# Patient Record
Sex: FEMALE | Race: WHITE | Employment: UNEMPLOYED | URBAN - METROPOLITAN AREA
[De-identification: names, ages, dates, MRNs, and addresses within clinical notes are randomized per-mention and may not be internally consistent; named-entity substitution may affect disease eponyms.]

---

## 2017-02-02 LAB
ABO + RH BLD: NORMAL
ABO + RH BLD: NORMAL
BLD GP AB SCN SERPL QL: NORMAL
HBV SURFACE AG SERPL QL IA: NORMAL
HIV 1+2 AB+HIV1 P24 AG SERPL QL IA: NORMAL
T PALLIDUM IGG SER QL: NON REACTIVE

## 2017-07-12 LAB
ABO + RH BLD: NORMAL
ABO + RH BLD: NORMAL

## 2017-07-27 ENCOUNTER — HOSPITAL ENCOUNTER (OUTPATIENT)
Facility: CLINIC | Age: 27
LOS: 1 days | Discharge: HOME | End: 2017-07-27
Attending: OBSTETRICS & GYNECOLOGY | Admitting: OBSTETRICS & GYNECOLOGY
Payer: MEDICAID

## 2017-07-27 VITALS — HEIGHT: 68 IN | WEIGHT: 220 LBS | BODY MASS INDEX: 33.34 KG/M2

## 2017-07-27 PROBLEM — O36.8190 DECREASED FETAL MOVEMENT: Status: ACTIVE | Noted: 2017-07-27

## 2017-07-27 PROCEDURE — 59025 FETAL NON-STRESS TEST: CPT

## 2017-07-27 PROCEDURE — 99213 OFFICE O/P EST LOW 20 MIN: CPT | Mod: 25

## 2017-07-27 RX ORDER — ZINC SULFATE 50(220)MG
220 CAPSULE ORAL 2 TIMES DAILY
Status: ON HOLD | COMMUNITY
End: 2017-10-01

## 2017-07-27 RX ORDER — PRENATAL VIT/IRON FUM/FOLIC AC 27MG-0.8MG
1 TABLET ORAL DAILY
COMMUNITY

## 2017-07-27 RX ORDER — ONDANSETRON 2 MG/ML
4 INJECTION INTRAMUSCULAR; INTRAVENOUS EVERY 6 HOURS PRN
Status: DISCONTINUED | OUTPATIENT
Start: 2017-07-27 | End: 2017-07-27 | Stop reason: HOSPADM

## 2017-07-27 NOTE — PLAN OF CARE
The patient arrives to the MAC due to decrease fetal movement.  She has not felt her baby move for 24 hours.  Patient denies any complications with her pregnancy.  The patient states her RLQ has been tender since last night and it was sharp last night.  Patient denies vaginal bleeding or contractions.

## 2017-07-27 NOTE — IP AVS SNAPSHOT
MRN:[id number]                      After Visit Summary   7/27/2017    Shruti Gamino    MRN: [id number]           Thank you!     Thank you for choosing [facility] for your care. Our goal is always to provide you with excellent care. Hearing back from our patients is one way we can continue to improve our services. Please take a few minutes to complete the written survey that you may receive in the mail after you visit with us. Thank you!        Patient Information     Date Of Birth          1990        Designated Caregiver       Most Recent Value    Caregiver    Will someone help with your care after discharge? yes    Name of designated caregiver Mahesh Gamino    Phone number of caregiver [phone]      About your hospital stay     You were admitted on:  July 27, 2017 You last received care in the:  [facility] [place]    You were discharged on:  July 27, 2017       Who to Call     For medical emergencies, please call 911.  For non-urgent questions about your medical care, please call your primary care provider or clinic, None          Attending Provider     Provider Specialty    Shira Astudillo MD OB/Gyn       Primary Care Provider    None Specified      Your next 10 appointments already scheduled     Sep 28, 2017   Procedure with Scotty Sheldon MD    Birthplace (--)    [address], [address]  [facility] [address] [phone]              Further instructions from your care team       Discharge Instruction for Undelivered Patients      You were seen for: Fetal Assessment  We Consulted: Dr. Astudillo  You had (Test or Medicine): fetal and uterine monitoring.     Diet:   Drink 8 to 12 glasses of liquids (milk, juice, water) every day.  You may eat meals and snacks.     Activity:  Count fetal kicks everyday (see handout)  Call your doctor or nurse midwife if your baby is moving less than usual.     Call your provider if you notice:  Swelling in your face or increased  "swelling in your hands or legs.  Headaches that are not relieved by Tylenol (acetaminophen).  Changes in your vision (blurring: seeing spots or stars.)  Nausea (sick to your stomach) and vomiting (throwing up).   Weight gain of 5 pounds or more per week.  Heartburn that doesn't go away.  Signs of bladder infection: pain when you urinate (use the toilet), need to go more often and more urgently.  The bag of aranda (rupture of membranes) breaks, or you notice leaking in your underwear.  Bright red blood in your underwear.  Abdominal (lower belly) or stomach pain.  For first baby: Contractions (tightening) less than 5 minutes apart for one hour or more.  Second (plus) baby: Contractions (tightening) less than 10 minutes apart and getting stronger.  *If less than 34 weeks: Contractions (tightenings) more than 6 times in one hour.  Increase or change in vaginal discharge (note the color and amount)  Other:  You had a reactive NST indicating fetal well being.    Follow-up:  As scheduled in the clinic          Pending Results     No orders found from 7/25/2017 to 7/28/2017.            Admission Information     Date & Time Provider Department Dept. Phone    7/27/2017 Shira Astudillo MD [facility] [phone]      Your Vitals Were     Height Weight BMI (Body Mass Index)             1.715 m (5' 7.5\") 99.8 kg (220 lb) 33.95 kg/m2         [employer] Information     [employer] lets you send messages to your doctor, view your test results, renew your prescriptions, schedule appointments and more. To sign up, go to [email or web address] . Click on \"Log in\" on the left side of the screen, which will take you to the Welcome page. Then click on \"Sign up Now\" on the right side of the page.     You will be asked to enter the access code listed below, as well as some personal information. Please follow the directions to create your username and password.     Your access code is: [email or web address]  Expires: 10/25/2017  5:31 " PM     Your access code will  in 90 days. If you need help or a new code, please call your [facility] clinic or [phone].        Care EveryWhere ID     This is your Care EveryWhere ID. This could be used by other organizations to access your [facility] medical records  [id number]        Equal Access to Services     BECKYESENIA MARY : Hadii gabriella ku hadasho Soomaali, waaxda luqadaha, qaybta kaalmada adeegyada, john pittshimamario carreon. So [facility] [phone].    ATENCIÓN: Si habla español, tiene a dutta disposición servicios gratuitos de asistencia lingüística. Llame al [phone].    We comply with applicable federal civil rights laws and [place] laws. We do not discriminate on the basis of race, color, national origin, age, disability sex, sexual orientation or gender identity.               Review of your medicines      UNREVIEWED medicines. Ask your doctor about these medicines        Dose / Directions    calcium-magnesium 500-250 MG Tabs per tablet   Commonly known as:  CALMAG        Dose:  2 tablet   Take 2 tablets by mouth daily   Refills:  0       prenatal multivitamin  plus iron 27-0.8 MG Tabs per tablet   Indication:  Pregnancy        Dose:  1 tablet   Take 1 tablet by mouth daily   Refills:  0       zinc sulfate 220 (50 ZN) MG capsule   Commonly known as:  ZINCATE        Dose:  220 mg   Take 220 mg by mouth 2 times daily   Refills:  0                Protect others around you: Learn how to safely use, store and throw away your medicines at www.disposemymeds.org.             Medication List: This is a list of all your medications and when to take them. Check marks below indicate your daily home schedule. Keep this list as a reference.      Medications           Morning Afternoon Evening Bedtime As Needed    calcium-magnesium 500-250 MG Tabs per tablet   Commonly known as:  CALMAG   Take 2 tablets by mouth daily                                prenatal multivitamin  plus iron 27-0.8 MG Tabs  per tablet   Take 1 tablet by mouth daily                                zinc sulfate 220 (50 ZN) MG capsule   Commonly known as:  ZINCATE   Take 220 mg by mouth 2 times daily

## 2017-07-27 NOTE — PLAN OF CARE
The patient just moved from [place] recently and there is minimal prenatal records. The patient states she has received her Rhogam injection at 28 weeks.

## 2017-07-27 NOTE — DISCHARGE INSTRUCTIONS
Discharge Instruction for Undelivered Patients      You were seen for: Fetal Assessment  We Consulted: Dr. Astudillo  You had (Test or Medicine): fetal and uterine monitoring.     Diet:   Drink 8 to 12 glasses of liquids (milk, juice, water) every day.  You may eat meals and snacks.     Activity:  Count fetal kicks everyday (see handout)  Call your doctor or nurse midwife if your baby is moving less than usual.     Call your provider if you notice:  Swelling in your face or increased swelling in your hands or legs.  Headaches that are not relieved by Tylenol (acetaminophen).  Changes in your vision (blurring: seeing spots or stars.)  Nausea (sick to your stomach) and vomiting (throwing up).   Weight gain of 5 pounds or more per week.  Heartburn that doesn't go away.  Signs of bladder infection: pain when you urinate (use the toilet), need to go more often and more urgently.  The bag of aranda (rupture of membranes) breaks, or you notice leaking in your underwear.  Bright red blood in your underwear.  Abdominal (lower belly) or stomach pain.  For first baby: Contractions (tightening) less than 5 minutes apart for one hour or more.  Second (plus) baby: Contractions (tightening) less than 10 minutes apart and getting stronger.  *If less than 34 weeks: Contractions (tightenings) more than 6 times in one hour.  Increase or change in vaginal discharge (note the color and amount)  Other:  You had a reactive NST indicating fetal well being.    Follow-up:  As scheduled in the clinic

## 2017-07-27 NOTE — IP AVS SNAPSHOT
[address], [address]    [facility] [address]    Phone:  [phone]                                       After Visit Summary   7/27/2017    Shruti Gamino    MRN: [id number]           After Visit Summary Signature Page     I have received my discharge instructions, and my questions have been answered. I have discussed any challenges I see with this plan with the nurse or doctor.    ..........................................................................................................................................  Patient/Patient Representative Signature      ..........................................................................................................................................  Patient Representative Print Name and Relationship to Patient    ..................................................               ................................................  Date                                            Time    ..........................................................................................................................................  Reviewed by Signature/Title    ...................................................              ..............................................  Date                                                            Time

## 2017-09-12 LAB — GROUP B STREP PCR: NORMAL

## 2017-09-28 ENCOUNTER — ANESTHESIA (OUTPATIENT)
Dept: OBGYN | Facility: CLINIC | Age: 27
End: 2017-09-28
Payer: COMMERCIAL

## 2017-09-28 ENCOUNTER — HOSPITAL ENCOUNTER (INPATIENT)
Facility: CLINIC | Age: 27
LOS: 3 days | Discharge: HOME HEALTH | End: 2017-10-01
Attending: OBSTETRICS & GYNECOLOGY | Admitting: OBSTETRICS & GYNECOLOGY
Payer: COMMERCIAL

## 2017-09-28 ENCOUNTER — ANESTHESIA EVENT (OUTPATIENT)
Dept: OBGYN | Facility: CLINIC | Age: 27
End: 2017-09-28
Payer: COMMERCIAL

## 2017-09-28 DIAGNOSIS — K59.00 CONSTIPATION, UNSPECIFIED CONSTIPATION TYPE: ICD-10-CM

## 2017-09-28 LAB
ABO + RH BLD: NORMAL
ABO + RH BLD: NORMAL
BLD GP AB SCN SERPL QL: NORMAL
BLOOD BANK CMNT PATIENT-IMP: NORMAL
BLOOD BANK CMNT PATIENT-IMP: NORMAL
HGB BLD-MCNC: 10.1 G/DL (ref 11.7–15.7)
SPECIMEN EXP DATE BLD: NORMAL
T PALLIDUM IGG+IGM SER QL: NEGATIVE

## 2017-09-28 PROCEDURE — 71000015 ZZH RECOVERY PHASE 1 LEVEL 2 EA ADDTL HR: Performed by: OBSTETRICS & GYNECOLOGY

## 2017-09-28 PROCEDURE — S0028 INJECTION, FAMOTIDINE, 20 MG: HCPCS | Performed by: OBSTETRICS & GYNECOLOGY

## 2017-09-28 PROCEDURE — 25000132 ZZH RX MED GY IP 250 OP 250 PS 637: Performed by: OBSTETRICS & GYNECOLOGY

## 2017-09-28 PROCEDURE — 36000056 ZZH SURGERY LEVEL 3 1ST 30 MIN: Performed by: OBSTETRICS & GYNECOLOGY

## 2017-09-28 PROCEDURE — 37000009 ZZH ANESTHESIA TECHNICAL FEE, EACH ADDTL 15 MIN: Performed by: OBSTETRICS & GYNECOLOGY

## 2017-09-28 PROCEDURE — 86901 BLOOD TYPING SEROLOGIC RH(D): CPT | Performed by: PHYSICIAN ASSISTANT

## 2017-09-28 PROCEDURE — 25000128 H RX IP 250 OP 636: Performed by: OBSTETRICS & GYNECOLOGY

## 2017-09-28 PROCEDURE — 25000125 ZZHC RX 250: Performed by: ANESTHESIOLOGY

## 2017-09-28 PROCEDURE — 36000058 ZZH SURGERY LEVEL 3 EA 15 ADDTL MIN: Performed by: OBSTETRICS & GYNECOLOGY

## 2017-09-28 PROCEDURE — 25000128 H RX IP 250 OP 636: Performed by: NURSE ANESTHETIST, CERTIFIED REGISTERED

## 2017-09-28 PROCEDURE — 25000125 ZZHC RX 250: Performed by: OBSTETRICS & GYNECOLOGY

## 2017-09-28 PROCEDURE — 37000008 ZZH ANESTHESIA TECHNICAL FEE, 1ST 30 MIN: Performed by: OBSTETRICS & GYNECOLOGY

## 2017-09-28 PROCEDURE — 86762 RUBELLA ANTIBODY: CPT | Performed by: PHYSICIAN ASSISTANT

## 2017-09-28 PROCEDURE — 25000128 H RX IP 250 OP 636: Performed by: PHYSICIAN ASSISTANT

## 2017-09-28 PROCEDURE — 25000132 ZZH RX MED GY IP 250 OP 250 PS 637: Performed by: PHYSICIAN ASSISTANT

## 2017-09-28 PROCEDURE — 25000125 ZZHC RX 250: Performed by: NURSE ANESTHETIST, CERTIFIED REGISTERED

## 2017-09-28 PROCEDURE — 86850 RBC ANTIBODY SCREEN: CPT | Performed by: PHYSICIAN ASSISTANT

## 2017-09-28 PROCEDURE — 12000037 ZZH R&B POSTPARTUM INTERMEDIATE

## 2017-09-28 PROCEDURE — 86780 TREPONEMA PALLIDUM: CPT | Performed by: PHYSICIAN ASSISTANT

## 2017-09-28 PROCEDURE — 85018 HEMOGLOBIN: CPT | Performed by: PHYSICIAN ASSISTANT

## 2017-09-28 PROCEDURE — 25000128 H RX IP 250 OP 636

## 2017-09-28 PROCEDURE — 86900 BLOOD TYPING SEROLOGIC ABO: CPT | Performed by: PHYSICIAN ASSISTANT

## 2017-09-28 PROCEDURE — 27210794 ZZH OR GENERAL SUPPLY STERILE: Performed by: OBSTETRICS & GYNECOLOGY

## 2017-09-28 PROCEDURE — 71000014 ZZH RECOVERY PHASE 1 LEVEL 2 FIRST HR: Performed by: OBSTETRICS & GYNECOLOGY

## 2017-09-28 RX ORDER — METOCLOPRAMIDE HYDROCHLORIDE 5 MG/ML
INJECTION INTRAMUSCULAR; INTRAVENOUS
Status: DISCONTINUED
Start: 2017-09-28 | End: 2017-09-28 | Stop reason: HOSPADM

## 2017-09-28 RX ORDER — ONDANSETRON 2 MG/ML
INJECTION INTRAMUSCULAR; INTRAVENOUS
Status: DISCONTINUED
Start: 2017-09-28 | End: 2017-09-28 | Stop reason: HOSPADM

## 2017-09-28 RX ORDER — CEFAZOLIN SODIUM 2 G/100ML
2 INJECTION, SOLUTION INTRAVENOUS
Status: COMPLETED | OUTPATIENT
Start: 2017-09-28 | End: 2017-09-28

## 2017-09-28 RX ORDER — ACETAMINOPHEN 325 MG/1
975 TABLET ORAL EVERY 8 HOURS
Status: COMPLETED | OUTPATIENT
Start: 2017-09-28 | End: 2017-10-01

## 2017-09-28 RX ORDER — HYDROMORPHONE HYDROCHLORIDE 1 MG/ML
0.5 INJECTION, SOLUTION INTRAMUSCULAR; INTRAVENOUS; SUBCUTANEOUS
Status: DISCONTINUED | OUTPATIENT
Start: 2017-09-28 | End: 2017-10-01 | Stop reason: HOSPADM

## 2017-09-28 RX ORDER — ONDANSETRON 2 MG/ML
4 INJECTION INTRAMUSCULAR; INTRAVENOUS EVERY 4 HOURS PRN
Status: DISCONTINUED | OUTPATIENT
Start: 2017-09-28 | End: 2017-10-01 | Stop reason: HOSPADM

## 2017-09-28 RX ORDER — BISACODYL 10 MG
10 SUPPOSITORY, RECTAL RECTAL DAILY PRN
Status: DISCONTINUED | OUTPATIENT
Start: 2017-09-30 | End: 2017-10-01 | Stop reason: HOSPADM

## 2017-09-28 RX ORDER — AMOXICILLIN 250 MG
1-2 CAPSULE ORAL 2 TIMES DAILY
Status: DISCONTINUED | OUTPATIENT
Start: 2017-09-28 | End: 2017-10-01 | Stop reason: HOSPADM

## 2017-09-28 RX ORDER — DEXTROSE, SODIUM CHLORIDE, SODIUM LACTATE, POTASSIUM CHLORIDE, AND CALCIUM CHLORIDE 5; .6; .31; .03; .02 G/100ML; G/100ML; G/100ML; G/100ML; G/100ML
INJECTION, SOLUTION INTRAVENOUS CONTINUOUS
Status: DISCONTINUED | OUTPATIENT
Start: 2017-09-28 | End: 2017-10-01 | Stop reason: HOSPADM

## 2017-09-28 RX ORDER — ACETAMINOPHEN 325 MG/1
650 TABLET ORAL EVERY 4 HOURS PRN
Status: DISCONTINUED | OUTPATIENT
Start: 2017-10-01 | End: 2017-10-01 | Stop reason: HOSPADM

## 2017-09-28 RX ORDER — OXYTOCIN/0.9 % SODIUM CHLORIDE 30/500 ML
PLASTIC BAG, INJECTION (ML) INTRAVENOUS
Status: DISCONTINUED
Start: 2017-09-28 | End: 2017-09-28 | Stop reason: HOSPADM

## 2017-09-28 RX ORDER — NALBUPHINE HYDROCHLORIDE 10 MG/ML
2.5-5 INJECTION, SOLUTION INTRAMUSCULAR; INTRAVENOUS; SUBCUTANEOUS EVERY 6 HOURS PRN
Status: DISCONTINUED | OUTPATIENT
Start: 2017-09-28 | End: 2017-10-01 | Stop reason: HOSPADM

## 2017-09-28 RX ORDER — FENTANYL CITRATE 50 UG/ML
INJECTION, SOLUTION INTRAMUSCULAR; INTRAVENOUS PRN
Status: DISCONTINUED | OUTPATIENT
Start: 2017-09-28 | End: 2017-09-28

## 2017-09-28 RX ORDER — ONDANSETRON 2 MG/ML
INJECTION INTRAMUSCULAR; INTRAVENOUS PRN
Status: DISCONTINUED | OUTPATIENT
Start: 2017-09-28 | End: 2017-09-28

## 2017-09-28 RX ORDER — OXYTOCIN/0.9 % SODIUM CHLORIDE 30/500 ML
340 PLASTIC BAG, INJECTION (ML) INTRAVENOUS CONTINUOUS PRN
Status: DISCONTINUED | OUTPATIENT
Start: 2017-09-28 | End: 2017-10-01 | Stop reason: HOSPADM

## 2017-09-28 RX ORDER — BUPIVACAINE HYDROCHLORIDE 7.5 MG/ML
INJECTION, SOLUTION INTRASPINAL PRN
Status: DISCONTINUED | OUTPATIENT
Start: 2017-09-28 | End: 2017-09-28

## 2017-09-28 RX ORDER — NALOXONE HYDROCHLORIDE 0.4 MG/ML
.1-.4 INJECTION, SOLUTION INTRAMUSCULAR; INTRAVENOUS; SUBCUTANEOUS
Status: DISCONTINUED | OUTPATIENT
Start: 2017-09-28 | End: 2017-10-01 | Stop reason: HOSPADM

## 2017-09-28 RX ORDER — DIPHENHYDRAMINE HYDROCHLORIDE 50 MG/ML
25 INJECTION INTRAMUSCULAR; INTRAVENOUS EVERY 6 HOURS PRN
Status: DISCONTINUED | OUTPATIENT
Start: 2017-09-28 | End: 2017-10-01 | Stop reason: HOSPADM

## 2017-09-28 RX ORDER — CEFAZOLIN SODIUM 1 G/3ML
1 INJECTION, POWDER, FOR SOLUTION INTRAMUSCULAR; INTRAVENOUS SEE ADMIN INSTRUCTIONS
Status: DISCONTINUED | OUTPATIENT
Start: 2017-09-28 | End: 2017-09-28 | Stop reason: HOSPADM

## 2017-09-28 RX ORDER — CITRIC ACID/SODIUM CITRATE 334-500MG
30 SOLUTION, ORAL ORAL
Status: COMPLETED | OUTPATIENT
Start: 2017-09-28 | End: 2017-09-28

## 2017-09-28 RX ORDER — KETOROLAC TROMETHAMINE 30 MG/ML
INJECTION, SOLUTION INTRAMUSCULAR; INTRAVENOUS
Status: COMPLETED
Start: 2017-09-28 | End: 2017-09-28

## 2017-09-28 RX ORDER — MORPHINE SULFATE 1 MG/ML
INJECTION, SOLUTION EPIDURAL; INTRATHECAL; INTRAVENOUS
Status: COMPLETED
Start: 2017-09-28 | End: 2017-09-28

## 2017-09-28 RX ORDER — MORPHINE SULFATE 1 MG/ML
INJECTION, SOLUTION EPIDURAL; INTRATHECAL; INTRAVENOUS PRN
Status: DISCONTINUED | OUTPATIENT
Start: 2017-09-28 | End: 2017-09-28

## 2017-09-28 RX ORDER — METOCLOPRAMIDE HYDROCHLORIDE 5 MG/ML
10 INJECTION INTRAMUSCULAR; INTRAVENOUS ONCE
Status: COMPLETED | OUTPATIENT
Start: 2017-09-28 | End: 2017-09-28

## 2017-09-28 RX ORDER — EPHEDRINE SULFATE 50 MG/ML
INJECTION, SOLUTION INTRAMUSCULAR; INTRAVENOUS; SUBCUTANEOUS PRN
Status: DISCONTINUED | OUTPATIENT
Start: 2017-09-28 | End: 2017-09-28

## 2017-09-28 RX ORDER — OXYTOCIN/0.9 % SODIUM CHLORIDE 30/500 ML
100 PLASTIC BAG, INJECTION (ML) INTRAVENOUS CONTINUOUS
Status: DISCONTINUED | OUTPATIENT
Start: 2017-09-28 | End: 2017-10-01 | Stop reason: HOSPADM

## 2017-09-28 RX ORDER — SODIUM CHLORIDE, SODIUM LACTATE, POTASSIUM CHLORIDE, CALCIUM CHLORIDE 600; 310; 30; 20 MG/100ML; MG/100ML; MG/100ML; MG/100ML
INJECTION, SOLUTION INTRAVENOUS CONTINUOUS
Status: DISCONTINUED | OUTPATIENT
Start: 2017-09-28 | End: 2017-09-28 | Stop reason: HOSPADM

## 2017-09-28 RX ORDER — SIMETHICONE 80 MG
80 TABLET,CHEWABLE ORAL 4 TIMES DAILY PRN
Status: DISCONTINUED | OUTPATIENT
Start: 2017-09-28 | End: 2017-10-01 | Stop reason: HOSPADM

## 2017-09-28 RX ORDER — IBUPROFEN 400 MG/1
400-800 TABLET, FILM COATED ORAL EVERY 6 HOURS PRN
Status: DISCONTINUED | OUTPATIENT
Start: 2017-09-29 | End: 2017-10-01 | Stop reason: HOSPADM

## 2017-09-28 RX ORDER — ONDANSETRON 2 MG/ML
4 INJECTION INTRAMUSCULAR; INTRAVENOUS EVERY 6 HOURS PRN
Status: DISCONTINUED | OUTPATIENT
Start: 2017-09-28 | End: 2017-10-01 | Stop reason: HOSPADM

## 2017-09-28 RX ORDER — OXYTOCIN 10 [USP'U]/ML
10 INJECTION, SOLUTION INTRAMUSCULAR; INTRAVENOUS
Status: DISCONTINUED | OUTPATIENT
Start: 2017-09-28 | End: 2017-10-01 | Stop reason: HOSPADM

## 2017-09-28 RX ORDER — HYDROCORTISONE 2.5 %
CREAM (GRAM) TOPICAL 3 TIMES DAILY PRN
Status: DISCONTINUED | OUTPATIENT
Start: 2017-09-28 | End: 2017-10-01 | Stop reason: HOSPADM

## 2017-09-28 RX ORDER — METOCLOPRAMIDE HYDROCHLORIDE 5 MG/ML
10 INJECTION INTRAMUSCULAR; INTRAVENOUS EVERY 6 HOURS PRN
Status: DISCONTINUED | OUTPATIENT
Start: 2017-09-28 | End: 2017-10-01 | Stop reason: HOSPADM

## 2017-09-28 RX ORDER — CITRIC ACID/SODIUM CITRATE 334-500MG
SOLUTION, ORAL ORAL
Status: DISCONTINUED
Start: 2017-09-28 | End: 2017-09-28 | Stop reason: HOSPADM

## 2017-09-28 RX ORDER — MORPHINE SULFATE 0.5 MG/ML
0.15 INJECTION, SOLUTION EPIDURAL; INTRATHECAL; INTRAVENOUS ONCE
Status: DISCONTINUED | OUTPATIENT
Start: 2017-09-28 | End: 2017-10-01 | Stop reason: HOSPADM

## 2017-09-28 RX ORDER — DIPHENHYDRAMINE HCL 25 MG
25 CAPSULE ORAL EVERY 6 HOURS PRN
Status: DISCONTINUED | OUTPATIENT
Start: 2017-09-28 | End: 2017-10-01 | Stop reason: HOSPADM

## 2017-09-28 RX ORDER — OXYCODONE HYDROCHLORIDE 5 MG/1
5-10 TABLET ORAL
Status: DISCONTINUED | OUTPATIENT
Start: 2017-09-28 | End: 2017-10-01 | Stop reason: HOSPADM

## 2017-09-28 RX ORDER — ZINC SULFATE 50(220)MG
220 CAPSULE ORAL 2 TIMES DAILY
Status: DISCONTINUED | OUTPATIENT
Start: 2017-09-28 | End: 2017-10-01 | Stop reason: HOSPADM

## 2017-09-28 RX ORDER — MISOPROSTOL 200 UG/1
400 TABLET ORAL
Status: DISCONTINUED | OUTPATIENT
Start: 2017-09-28 | End: 2017-10-01 | Stop reason: HOSPADM

## 2017-09-28 RX ORDER — KETOROLAC TROMETHAMINE 30 MG/ML
30 INJECTION, SOLUTION INTRAMUSCULAR; INTRAVENOUS EVERY 6 HOURS
Status: DISPENSED | OUTPATIENT
Start: 2017-09-28 | End: 2017-09-29

## 2017-09-28 RX ORDER — OXYTOCIN/0.9 % SODIUM CHLORIDE 30/500 ML
PLASTIC BAG, INJECTION (ML) INTRAVENOUS CONTINUOUS PRN
Status: DISCONTINUED | OUTPATIENT
Start: 2017-09-28 | End: 2017-09-28

## 2017-09-28 RX ORDER — CEFAZOLIN SODIUM 2 G/100ML
INJECTION, SOLUTION INTRAVENOUS
Status: DISCONTINUED
Start: 2017-09-28 | End: 2017-09-28 | Stop reason: HOSPADM

## 2017-09-28 RX ORDER — LIDOCAINE 40 MG/G
CREAM TOPICAL
Status: DISCONTINUED | OUTPATIENT
Start: 2017-09-28 | End: 2017-10-01 | Stop reason: HOSPADM

## 2017-09-28 RX ORDER — PROCHLORPERAZINE 25 MG
25 SUPPOSITORY, RECTAL RECTAL EVERY 12 HOURS PRN
Status: DISCONTINUED | OUTPATIENT
Start: 2017-09-28 | End: 2017-10-01 | Stop reason: HOSPADM

## 2017-09-28 RX ORDER — FENTANYL CITRATE 50 UG/ML
INJECTION, SOLUTION INTRAMUSCULAR; INTRAVENOUS
Status: DISCONTINUED
Start: 2017-09-28 | End: 2017-09-28 | Stop reason: HOSPADM

## 2017-09-28 RX ORDER — LANOLIN 100 %
OINTMENT (GRAM) TOPICAL
Status: DISCONTINUED | OUTPATIENT
Start: 2017-09-28 | End: 2017-10-01 | Stop reason: HOSPADM

## 2017-09-28 RX ORDER — PRENATAL VIT/IRON FUM/FOLIC AC 27MG-0.8MG
1 TABLET ORAL DAILY
Status: DISCONTINUED | OUTPATIENT
Start: 2017-09-28 | End: 2017-10-01 | Stop reason: HOSPADM

## 2017-09-28 RX ORDER — SCOLOPAMINE TRANSDERMAL SYSTEM 1 MG/1
1 PATCH, EXTENDED RELEASE TRANSDERMAL
Status: DISCONTINUED | OUTPATIENT
Start: 2017-09-28 | End: 2017-10-01 | Stop reason: HOSPADM

## 2017-09-28 RX ADMIN — OXYTOCIN-SODIUM CHLORIDE 0.9% IV SOLN 30 UNIT/500ML 100 ML/HR: 30-0.9/5 SOLUTION at 14:08

## 2017-09-28 RX ADMIN — SCOPALAMINE 1 PATCH: 1 PATCH, EXTENDED RELEASE TRANSDERMAL at 15:08

## 2017-09-28 RX ADMIN — SODIUM CHLORIDE, POTASSIUM CHLORIDE, SODIUM LACTATE AND CALCIUM CHLORIDE 1000 ML: 600; 310; 30; 20 INJECTION, SOLUTION INTRAVENOUS at 07:50

## 2017-09-28 RX ADMIN — FENTANYL CITRATE 15 MCG: 50 INJECTION, SOLUTION INTRAMUSCULAR; INTRAVENOUS at 09:14

## 2017-09-28 RX ADMIN — Medication 340 ML/HR: at 09:34

## 2017-09-28 RX ADMIN — SODIUM CHLORIDE, POTASSIUM CHLORIDE, SODIUM LACTATE AND CALCIUM CHLORIDE: 600; 310; 30; 20 INJECTION, SOLUTION INTRAVENOUS at 08:28

## 2017-09-28 RX ADMIN — PHENYLEPHRINE HYDROCHLORIDE 200 MCG: 10 INJECTION, SOLUTION INTRAMUSCULAR; INTRAVENOUS; SUBCUTANEOUS at 09:18

## 2017-09-28 RX ADMIN — KETOROLAC TROMETHAMINE 30 MG: 30 INJECTION, SOLUTION INTRAMUSCULAR at 17:19

## 2017-09-28 RX ADMIN — KETOROLAC TROMETHAMINE 30 MG: 30 INJECTION, SOLUTION INTRAMUSCULAR at 10:44

## 2017-09-28 RX ADMIN — SODIUM CITRATE AND CITRIC ACID MONOHYDRATE 30 ML: 500; 334 SOLUTION ORAL at 08:50

## 2017-09-28 RX ADMIN — Medication 10 MG: at 09:19

## 2017-09-28 RX ADMIN — SODIUM CHLORIDE, SODIUM LACTATE, POTASSIUM CHLORIDE, CALCIUM CHLORIDE, AND DEXTROSE MONOHYDRATE: 600; 310; 30; 20; 5 INJECTION, SOLUTION INTRAVENOUS at 19:37

## 2017-09-28 RX ADMIN — MORPHINE SULFATE 0.15 MG: 1 INJECTION EPIDURAL; INTRATHECAL; INTRAVENOUS at 09:14

## 2017-09-28 RX ADMIN — KETOROLAC TROMETHAMINE 30 MG: 30 INJECTION, SOLUTION INTRAMUSCULAR at 22:46

## 2017-09-28 RX ADMIN — ONDANSETRON 4 MG: 2 INJECTION INTRAMUSCULAR; INTRAVENOUS at 09:22

## 2017-09-28 RX ADMIN — BUPIVACAINE HYDROCHLORIDE IN DEXTROSE 12 MG: 7.5 INJECTION, SOLUTION SUBARACHNOID at 09:14

## 2017-09-28 RX ADMIN — CEFAZOLIN SODIUM 2 G: 2 INJECTION, SOLUTION INTRAVENOUS at 09:12

## 2017-09-28 RX ADMIN — Medication 5 MG: at 09:20

## 2017-09-28 RX ADMIN — ACETAMINOPHEN 975 MG: 325 TABLET, FILM COATED ORAL at 17:20

## 2017-09-28 RX ADMIN — SODIUM CHLORIDE, POTASSIUM CHLORIDE, SODIUM LACTATE AND CALCIUM CHLORIDE: 600; 310; 30; 20 INJECTION, SOLUTION INTRAVENOUS at 09:10

## 2017-09-28 RX ADMIN — FAMOTIDINE 20 MG: 10 INJECTION, SOLUTION INTRAVENOUS at 08:51

## 2017-09-28 RX ADMIN — METOCLOPRAMIDE 10 MG: 5 INJECTION, SOLUTION INTRAMUSCULAR; INTRAVENOUS at 08:51

## 2017-09-28 ASSESSMENT — ENCOUNTER SYMPTOMS
SEIZURES: 0
DYSRHYTHMIAS: 0

## 2017-09-28 ASSESSMENT — LIFESTYLE VARIABLES: TOBACCO_USE: 0

## 2017-09-28 ASSESSMENT — COPD QUESTIONNAIRES: COPD: 0

## 2017-09-28 NOTE — IP AVS SNAPSHOT
[address]    [place] [address]    Phone:  [phone]                                       After Visit Summary   9/28/2017    Shruti Gamino    MRN: [id number]           After Visit Summary Signature Page     I have received my discharge instructions, and my questions have been answered. I have discussed any challenges I see with this plan with the nurse or doctor.    ..........................................................................................................................................  Patient/Patient Representative Signature      ..........................................................................................................................................  Patient Representative Print Name and Relationship to Patient    ..................................................               ................................................  Date                                            Time    ..........................................................................................................................................  Reviewed by Signature/Title    ...................................................              ..............................................  Date                                                            Time

## 2017-09-28 NOTE — ANESTHESIA POSTPROCEDURE EVALUATION
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    Anesthesia Type:  Spinal    Note:  Anesthesia Post Evaluation    Patient location during evaluation: OB PACU  Patient participation: Able to participate in evaluation but full recovery from regional anesthesia has not yet ocurrred but is anticipated to occur within 48 hours  Level of consciousness: awake and alert  Pain management: adequate  Airway patency: patent  Cardiovascular status: acceptable, blood pressure returned to baseline and hemodynamically stable  Respiratory status: acceptable  Hydration status: acceptable  PONV: none     Anesthetic complications: None    Comments: Spinal level receeding as expected.         Last vitals:  Vitals:    17 1157 17 1230 17 1300   BP: 103/63 109/59 111/58   Resp: 9 16 16   Temp:      SpO2: 94% 99% 100%         Electronically Signed By: Kirsten Guzman MD  2017  1:59 PM

## 2017-09-28 NOTE — ANESTHESIA PREPROCEDURE EVALUATION
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  Past Surgical History:   Procedure Laterality Date     CHOLECYSTECTOMY       DILATION AND CURETTAGE       Prior to Admission medications    Medication Sig Start Date End Date Taking? Authorizing Provider   Prenatal Vit-Fe Fumarate-FA (PRENATAL MULTIVITAMIN  PLUS IRON) 27-0.8 MG TABS per tablet Take 1 tablet by mouth daily    Reported, Patient   calcium-magnesium (CALMAG) 500-250 MG TABS per tablet Take 2 tablets by mouth daily    Reported, Patient   zinc sulfate (ZINCATE) 220 (50 ZN) MG capsule Take 220 mg by mouth 2 times daily    Reported, Patient     No current Epic-ordered facility-administered medications on file.      Current Outpatient Prescriptions Ordered in Epic   Medication     Prenatal Vit-Fe Fumarate-FA (PRENATAL MULTIVITAMIN  PLUS IRON) 27-0.8 MG TABS per tablet     calcium-magnesium (CALMAG) 500-250 MG TABS per tablet     zinc sulfate (ZINCATE) 220 (50 ZN) MG capsule     Wt Readings from Last 1 Encounters:   17 99.8 kg (220 lb)     Anesthesia Evaluation     .             ROS/MED HX    ENT/Pulmonary:      (-) tobacco use, asthma, COPD and sleep apnea   Neurologic:      (-) seizures, CVA and migraines   Cardiovascular:        (-) hypertension, CAD, arrhythmias, valvular problems/murmurs and dyslipidemia   METS/Exercise Tolerance:  >4 METS   Hematologic:        (-) history of blood clots, anemia and other hematologic disorder   Musculoskeletal:        (-) arthritis   GI/Hepatic:        (-) GERD and liver disease   Renal/Genitourinary:      (-) renal disease and nephrolithiasis   Endo:      (-) Type I DM, Type II DM, thyroid disease and obesity   Psychiatric:     (+) psychiatric history anxiety      Infectious Disease:        (-) Recent Fever   Malignancy:         Other:                     Physical Exam  Normal systems:  cardiovascular, pulmonary and dental    Airway   Mallampati: II  TM distance: >3 FB  Neck ROM: full    Dental     Cardiovascular   Rhythm and rate: regular and normal  (-) no murmur    Pulmonary                     Anesthesia Plan      History & Physical Review      ASA Status:  2 .    NPO Status:  > 8 hours    Plan for Spinal   PONV prophylaxis:  Ondansetron (or other 5HT-3)  Intrathecal fentanyl and morphine      Postoperative Care  Postoperative pain management:  Multi-modal analgesia.      Consents  Anesthetic plan, risks, benefits and alternatives discussed with:  Patient..                          .

## 2017-09-28 NOTE — ANESTHESIA PROCEDURE NOTES
Peripheral nerve/Neuraxial procedure note : intrathecal  Pre-Procedure  Performed by SHA MORAES  Location: OB      Pre-Anesthestic Checklist: patient identified, IV checked, site marked, risks and benefits discussed, informed consent, monitors and equipment checked, pre-op evaluation, at physician/surgeon's request and post-op pain management    Timeout  Correct Patient: Yes   Correct Procedure: Yes   Correct Site: Yes   Correct Laterality: Yes   Correct Position: Yes   Site Marked: Yes   .   Procedure Documentation    .    Procedure:    Intrathecal.  Insertion Site:L2-3  (midline approach)      Patient Prep;povidone-iodine 7.5% surgical scrub.  .  Needle: [place] tip Spinal Needle (gauge): 25  Spinal/LP Needle Length (inches): 3 # of attempts: 1 and # of redirects:  Introducer used Introducer: 20 G .       Assessment/Narrative  Paresthesias: No.  .  .  clear CSF fluid removed while sitting   . Comments:  Patient sitting on edge of OR bed, lower back cleaned and prepped in sterile fashion with betadine. 1% lido used to numb area. Introducer placed, spinal needle through introducer. Appropriate flow of CSF and confirmed with aspiration via syringe. Spinal dose given, 12 mg 0.75% bupivacaine, 150 mcg morphine, 15 mcg fentanyl. No complications.

## 2017-09-28 NOTE — H&P
[facility] Labor and Delivery History and Physical    Shruti Gamino MRN# [id number]   Age: 27 year old YOB: 1990     Date of Admission:  2017    Primary care provider: No primary care provider on file.           Chief Complaint:   Shruti Gamino is a 27 year old female who is 39w1d pregnant and being admitted for . She is a transfer patient from [place]. She had GBS in her UCx, and I cannot find her Rubella result today. She had a 4th degree perineal laceration and has noticed episodes of bowel leakage. We previously discussed pros and cons of LTCS to avoid further injury, and we reviewed recent study from [facility] on the topic. She agrees to LTCS as a possible strategy to avoid further injury to an already damaged EAS.          Pregnancy history:     OBSTETRIC HISTORY:    Obstetric History       T1      L0     SAB0   TAB0   Ectopic0   Multiple0   Live Births1       # Outcome Date GA Lbr Rafy/2nd Weight Sex Delivery Anes PTL Lv   3 Current            2 SAB            1 Term                   EDC: Estimated Date of Delivery: Oct 4, 2017    Prenatal Labs:   Lab Results   Component Value Date    ABO A 2017    RH Neg 2017    AS Neg 2017    HEPBANG Neg 2017    TREPAB non reactive 2017    HGB 10.1 (L) 2017       GBS Status:   Lab Results   Component Value Date    GBS pos 2017       Active Problem List  Patient Active Problem List   Diagnosis     Decreased fetal movement       Medication Prior to Admission  Prescriptions Prior to Admission   Medication Sig Dispense Refill Last Dose     Prenatal Vit-Fe Fumarate-FA (PRENATAL MULTIVITAMIN  PLUS IRON) 27-0.8 MG TABS per tablet Take 1 tablet by mouth daily   Past Week at Unknown time     calcium-magnesium (CALMAG) 500-250 MG TABS per tablet Take 2 tablets by mouth daily   2017 at Unknown time     zinc sulfate (ZINCATE) 220 (50 ZN) MG capsule Take 220 mg by mouth 2 times daily   More  than a month at Unknown time   .        Maternal Past Medical History:   History reviewed. No pertinent past medical history.                    Family History:   No family history on file.  Family history reviewed and updated in [facility]            Social History:     Social History   Substance Use Topics     Smoking status: Never Smoker     Smokeless tobacco: Never Used     Alcohol use No            Review of Systems:   C: NEGATIVE for fever, chills, change in weight  E/M: NEGATIVE for ear, mouth and throat problems  R: NEGATIVE for significant cough or SOB  CV: NEGATIVE for chest pain, palpitations or peripheral edema          Physical Exam:   Vitals were reviewed    Constitutional: Awake, alert, cooperative, no apparent distress, and appears stated age.  Eyes: Lids and lashes normal, pupils equal, round and reactive to light, extra ocular muscles intact, sclera clear, conjunctiva normal.  ENT: Normocephalic, without obvious abnormality, atramatic, sinuses nontender on palpation, external ears without lesions, oral pharynx with moist mucus membranes, tonsils without erythema or exudates, gums normal and good dentition.  Neck: Supple, symmetrical, trachea midline, no adenopathy, thyroid symmetric, not enlarged and no tenderness, skin normal.  Hematologic / Lymphatic: No cervical lymphadenopathy and no supraclavicular lymphadenopathy.  Back: Symmetric, no curvature, spinous processes are non-tender on palpation, paraspinous muscles are non-tender on palpation, no costal vertebral tenderness.  Lungs: No increased work of breathing, good air exchange, clear to auscultation bilaterally, no crackles or wheezing.  Cardiovascular: Regular rate and rhythm, normal S1 and S2, no S3 or S4, and no murmur noted.  Chest / Breast: Breasts symmetrical, skin without lesion(s), no nipple retraction or dimpling, no nipple discharge, no masses palpated, no axillary or supraclavicular adenopathy.  Abdomen: No scars, normal bowel sounds,  soft, non-distended, non-tender, no masses palpated, no hepatosplenomegally.  Genitourinary: No urethral discharge, normal external genitalia, no hernia.  Musculoskeletal: No redness, warmth, or swelling of the joints.  Full range of motion noted.  Motor strength is 5 out of 5 all extremities bilaterally.  Tone is normal.  Neurologic: Awake, alert, oriented to name, place and time.  Cranial nerves II-XII are grossly intact.  Motor is 5 out of 5 bilaterally.  Cerebellar finger to nose, heel to shin intact.  Sensory is intact.  Babinski down going, Romberg negative, and gait is normal.  Neuropsychiatric: Normal affect, mood, orientation, memory and insight.  Skin: No rashes, erythema, pallor, petechia or purpura.     Cervix:   Membranes: intact   Dilation: closed   Effacement: Deferred   Station:FLOATING    Presentation:Cephalic  Fetal Heart Rate Tracing: reactive and reassuring  Tocometer: external monitor                       Assessment:   Shruti Gamino is a 39w1d pregnant female admitted with  due to perineal trauma with last vaginal delivery. +GB Son uirine culture           Plan:   Admit - see IP orders  Prepare for  section    Scotty Sheldon MD

## 2017-09-28 NOTE — BRIEF OP NOTE
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 h/o perineal trauma with fecal incontinence     Procedure: Procedure(s):  PRIMARY  SECTION - Wound Class: II-Clean Contaminated   Surgeon(s): Surgeon(s) and Role:     * Scotty Sheldon MD - Primary   Estimated blood loss: 500 mL    Specimens:   ID Type Source Tests Collected by Time Destination   1 :  Cord blood Umbilical Cord OR DOCUMENTATION ONLY Maryanne Tamayo RN 2017  9:32 AM    2 :  Placenta Placenta SPECIMEN DISCARDED Scotty Sheldon MD 2017  9:33 AM       Findings: Viable female 8# 9 oz I  Believe. Healthy apgars. Normal placenta with 3 VC.

## 2017-09-28 NOTE — ANESTHESIA CARE TRANSFER NOTE
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    Anesthesia Type:   Spinal     Note:  Airway :Room Air  Patient transferred to:Labor and Delivery  Comments: Pt to recovery. VSS. Report complete to RN.      Vitals: (Last set prior to Anesthesia Care Transfer)    CRNA VITALS  2017 0934 - 2017 1006      2017             Resp Rate (set): 10                Electronically Signed By: Lynn Stephenson CRNA, KELTON MURRY  2017  10:06 AM

## 2017-09-28 NOTE — IP AVS SNAPSHOT
MRN:[id number]                      After Visit Summary   2017    Shruti Gamino    MRN: [id number]           Thank you!     Thank you for choosing [facility] for your care. Our goal is always to provide you with excellent care. Hearing back from our patients is one way we can continue to improve our services. Please take a few minutes to complete the written survey that you may receive in the mail after you visit with us. Thank you!        Patient Information     Date Of Birth          1990        Designated Caregiver       Most Recent Value    Caregiver    Will someone help with your care after discharge? yes    Name of designated caregiver Mahesh Gamino    Phone number of caregiver [phone]      About your hospital stay     You were admitted on:  2017 You last received care in the:  [address]    You were discharged on:  2017       Who to Call     For medical emergencies, please call 911.  For non-urgent questions about your medical care, please call your primary care provider or clinic, None  For questions related to your surgery, please call your surgery clinic        Attending Provider     Provider Scotty Azul MD OB/Gyn       Primary Care Provider    None Specified      Follow-up Appointments     Follow-up and recommended labs and tests        Follow up with Dr. freeman in 2 weeks.                  Further instructions from your care team       Postop  Birth Instructions    Activity       Do not lift more than 10 pounds for 6 weeks after surgery.  Ask family and friends for help when you need it.    No driving until you have stopped taking your pain medications (usually two weeks after surgery).    No heavy exercise or activity for 6 weeks.  Don't do anything that will put a strain on your surgery site.    Don't strain when using the toilet.  Your care team may prescribe a stool softener if you have  problems with your bowel movements.     To care for your incision:       Keep the incision clean and dry.    Do not soak your incision in water. No swimming or hot tubs until it has fully healed. You may soak in the bathtub if the water level is below your incision.    Do not use peroxide, gel, cream, lotion, or ointment on your incision.    Adjust your clothes to avoid pressure on your surgery site (check the elastic in your underwear for example).     You may see a small amount of clear or pink drainage and this is normal.  Check with your health care provider:       If the drainage increases or has an odor.    If the incision reddens, you have swelling, or develop a rash.    If you have increased pain and the medicine we prescribed doesn't help.    If you have a fever above 100.4 F (38 C) with or without chills when placing thermometer under your tongue.   The area around your incision (surgery wound), will feel numb.  This is normal. The numbness should go away in less than a year.     Keep your hands clean:  Always wash your hands before touching your incision (surgery wound). This helps reduce your risk of infection. If your hands aren't dirty, you may use an alcohol hand-rub to clean your hands. Keep your nails clean and short.    Call your healthcare provider if you have any of these symptoms:       You soak a sanitary pad with blood within 1 hour, or you see blood clots larger than a golf ball.    Bleeding that lasts more than 6 weeks.    Vaginal discharge that smells bad.    Severe pain, cramping or tenderness in your lower belly area.    A need to urinate more frequently (use the toilet more often), more urgently (use the toilet very quickly), or it burns when you urinate.    Nausea and vomiting.    Redness, swelling or pain around a vein in your leg.    Problems breastfeeding or a red or painful area on your breast.    Chest pain and cough or are gasping for air.    Problems with coping with sadness,  "anxiety or depression. If you have concerns about hurting yourself or the baby, call your provider immediately.      You have questions or concerns after you return home.                  Pending Results     Date and Time Order Name Status Description    2017 0750 Rubella Antibody IgG Quantitative In process             Statement of Approval     Ordered          10/01/17 1137  I have reviewed and agree with all the recommendations and orders detailed in this document.  EFFECTIVE NOW     Approved and electronically signed by:  Scotty Napier MD             Admission Information     Date & Time Provider Department Dept. Phone    2017 Scotty Sheldon MD [address] [phone]      Your Vitals Were     Blood Pressure Pulse Temperature Respirations Height Weight    113/67 56 97.6  F (36.4  C) (Oral) 16 1.702 m (5' 7\") 97.5 kg (215 lb)    Pulse Oximetry BMI (Body Mass Index)                96% 33.67 kg/m2          MyChart Information     [facility] lets you send messages to your doctor, view your test results, renew your prescriptions, schedule appointments and more. To sign up, go to [email or web address] . Click on \"Log in\" on the left side of the screen, which will take you to the Welcome page. Then click on \"Sign up Now\" on the right side of the page.     You will be asked to enter the access code listed below, as well as some personal information. Please follow the directions to create your username and password.     Your access code is: [email or web address]  Expires: 10/25/2017  5:31 PM     Your access code will  in 90 days. If you need help or a new code, please call your [facility] clinic or [phone].        Care EveryWhere ID     This is your Care EveryWhere ID. This could be used by other organizations to access your [facility] medical records  [id number]        Equal Access to Services     FLOR HERNANDEZ AH: Mirlande Ocampo, isaiah swift, yesi " john connergita morris ah. Susi [facility] [phone].    ATENCIÓN: Si cecilia schrader, tiene a dutta disposición servicios gratuitos de asistencia lingüística. Llhitesh al [phone].    We comply with applicable federal civil rights laws and [place] laws. We do not discriminate on the basis of race, color, national origin, age, disability, sex, sexual orientation, or gender identity.               Review of your medicines      START taking        Dose / Directions    ibuprofen 800 MG tablet   Commonly known as:  ADVIL/MOTRIN        Dose:  800 mg   Take 1 tablet (800 mg) by mouth every 6 hours as needed for other (cramping)   Quantity:  30 tablet   Refills:  0       oxyCODONE 5 MG IR tablet   Commonly known as:  ROXICODONE        Dose:  5-10 mg   Take 1-2 tablets (5-10 mg) by mouth every 3 hours as needed for moderate to severe pain   Quantity:  30 tablet   Refills:  0       senna-docusate 8.6-50 MG per tablet   Commonly known as:  SENOKOT-S;PERICOLACE   Used for:  Constipation, unspecified constipation type        Dose:  1-2 tablet   Take 1-2 tablets by mouth 2 times daily   Quantity:  30 tablet   Refills:  1         CONTINUE these medicines which have NOT CHANGED        Dose / Directions    calcium-magnesium 500-250 MG Tabs per tablet   Commonly known as:  CALMAG        Dose:  2 tablet   Take 2 tablets by mouth daily   Refills:  0       prenatal multivitamin plus iron 27-0.8 MG Tabs per tablet   Indication:  Pregnancy        Dose:  1 tablet   Take 1 tablet by mouth daily   Refills:  0         STOP taking     zinc sulfate 220 (50 ZN) MG capsule   Commonly known as:  ZINCATE                Where to get your medicines      Some of these will need a paper prescription and others can be bought over the counter. Ask your nurse if you have questions.     Bring a paper prescription for each of these medications     ibuprofen 800 MG tablet    oxyCODONE 5 MG IR tablet    senna-docusate 8.6-50 MG  per tablet                Protect others around you: Learn how to safely use, store and throw away your medicines at www.disposemymeds.org.             Medication List: This is a list of all your medications and when to take them. Check marks below indicate your daily home schedule. Keep this list as a reference.      Medications           Morning Afternoon Evening Bedtime As Needed    calcium-magnesium 500-250 MG Tabs per tablet   Commonly known as:  CALMAG   Take 2 tablets by mouth daily                                ibuprofen 800 MG tablet   Commonly known as:  ADVIL/MOTRIN   Take 1 tablet (800 mg) by mouth every 6 hours as needed for other (cramping)   Last time this was given:  800 mg on 10/1/2017  1:51 PM   Next Dose Due:  7:51 pm                                oxyCODONE 5 MG IR tablet   Commonly known as:  ROXICODONE   Take 1-2 tablets (5-10 mg) by mouth every 3 hours as needed for moderate to severe pain   Last time this was given:  10 mg on 10/1/2017  1:51 PM   Next Dose Due:  4:51 pm                                prenatal multivitamin plus iron 27-0.8 MG Tabs per tablet   Take 1 tablet by mouth daily   Last time this was given:  1 tablet on 9/30/2017  5:57 PM   Next Dose Due:  10/1/17                                senna-docusate 8.6-50 MG per tablet   Commonly known as:  SENOKOT-S;PERICOLACE   Take 1-2 tablets by mouth 2 times daily   Last time this was given:  1 tablet on 10/1/2017 10:19 AM   Next Dose Due:  10/1/17 8:00 pm

## 2017-09-28 NOTE — PLAN OF CARE
Problem: Patient Care Overview  Goal: Plan of Care/Patient Progress Review  Outcome: No Change  Pt. arrived on floor at 1200, oriented to unit and safety protocols. IV Pitocin infusing. VSS, fundus firm, small flow. Has had emesis x 2, scopolamine patch ordered. Gisele care done this afternoon. Breastfeeding a baby girl. Toradol for pain. Inc. Intact with surgical glue.

## 2017-09-28 NOTE — OP NOTE
DATE OF PROCEDURE:  09/28/2017.      PREOPERATIVE DIAGNOSES:   1.  Intrauterine pregnancy at 39 weeks.   2.  Group B Streptococcus positive.    3.  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      POSTOPERATIVE DIAGNOSES:   1.  Intrauterine pregnancy at 39 weeks.   2.  Group B Streptococcus positive.   3.  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      PROCEDURE:  Low transverse caesarean section.      SURGEON:  Scotty Sheldon MD      ASSISTANT:  None.      ANESTHESIA:  Spinal.      ESTIMATED BLOOD LOSS:  500 mL.      COMPLICATIONS:  None.      SPECIMENS:  Cord blood.      FINDINGS:  Shruti Gamino delivered a viable female infant named Patti, but the family plans on calling her Anisa.  She weighed in the 8-pound range.  Her Apgars were healthy, but were unassigned at the time of this dictation.  The patient had a normal placenta with 3-vessel cord and her other anatomy appeared normal.      DESCRIPTION OF OPERATIVE PROCEDURE:  After obtaining informed consent, the patient was prepped and draped in the usual manner for an abdominal procedure.  A scalpel was used to create a Pfannenstiel skin incision, which was carried through to the level of the fascia.  Fascia was nicked, undermined with Posada scissors and extended superiorly and inferiorly.  Rectus fascia was  from the rectus musculature and the bladder blade was placed.  We bluntly entered the parietoperitoneum and replaced the bladder blade.  The vesicouterine peritoneum was incised and we created a bladder flap and the lead central retractor was replaced.  Next, I used a scalpel to enter the uterine cavity.  The baby was in occiput posterior position and had a cord around the neck x1 which was reduced.  Fundal pressure allowed delivery of the fetal head and the rest of infant delivered without incident.  We waited over a minute to do cord clamping, and once this was complete, I handed the  infant to waiting pediatric staff.  Cord bloods segments were drawn, specimens were obtained and the placenta was delivered from the inside of the uterus using manual retraction.  The uterine cavity was cleared of placenta, clots and debris and I closed the uterine incision with 0 Vicryl in a running locking stitch.  Copious irrigation and suction occurred and we closed the parietoperitoneum with 3-0 Vicryl in a running stitch.  Fascia was closed with 0 Vicryl in a running stitch.  Adipose was closed with 2-0 plain in a running stitch.  Skin was closed with 4-0 Vicryl in a subcuticular stitch.  The wound was sealed with Dermabond.  Needle, sponge and instrument counts were correct.  The patient tolerated the procedure well.      DISPOSITION:  The patient is in satisfactory stable condition and is in recovery.         SCOTTY ANDRADE MD             D: 2017 10:01   T: 2017 16:52   MT: TS      Name:     RONAL LANDRY   MRN:      [id number]        Account:        [id number]   :      1990           Procedure Date: 2017      Document: [id number]       cc: Scotty Andrade MD

## 2017-09-29 LAB — HGB BLD-MCNC: 8.8 G/DL (ref 11.7–15.7)

## 2017-09-29 PROCEDURE — 25000128 H RX IP 250 OP 636: Performed by: OBSTETRICS & GYNECOLOGY

## 2017-09-29 PROCEDURE — 36415 COLL VENOUS BLD VENIPUNCTURE: CPT | Performed by: OBSTETRICS & GYNECOLOGY

## 2017-09-29 PROCEDURE — 85461 HEMOGLOBIN FETAL: CPT | Performed by: OBSTETRICS & GYNECOLOGY

## 2017-09-29 PROCEDURE — 25000132 ZZH RX MED GY IP 250 OP 250 PS 637: Performed by: OBSTETRICS & GYNECOLOGY

## 2017-09-29 PROCEDURE — 12000037 ZZH R&B POSTPARTUM INTERMEDIATE

## 2017-09-29 PROCEDURE — 86901 BLOOD TYPING SEROLOGIC RH(D): CPT | Performed by: OBSTETRICS & GYNECOLOGY

## 2017-09-29 PROCEDURE — 85018 HEMOGLOBIN: CPT | Performed by: OBSTETRICS & GYNECOLOGY

## 2017-09-29 PROCEDURE — 86900 BLOOD TYPING SEROLOGIC ABO: CPT | Performed by: OBSTETRICS & GYNECOLOGY

## 2017-09-29 RX ADMIN — OXYCODONE HYDROCHLORIDE 10 MG: 5 TABLET ORAL at 21:05

## 2017-09-29 RX ADMIN — IBUPROFEN 800 MG: 400 TABLET ORAL at 21:05

## 2017-09-29 RX ADMIN — ACETAMINOPHEN 975 MG: 325 TABLET, FILM COATED ORAL at 17:33

## 2017-09-29 RX ADMIN — SODIUM CHLORIDE, SODIUM LACTATE, POTASSIUM CHLORIDE, CALCIUM CHLORIDE, AND DEXTROSE MONOHYDRATE: 600; 310; 30; 20; 5 INJECTION, SOLUTION INTRAVENOUS at 03:22

## 2017-09-29 RX ADMIN — ACETAMINOPHEN 975 MG: 325 TABLET, FILM COATED ORAL at 09:32

## 2017-09-29 RX ADMIN — OXYCODONE HYDROCHLORIDE 10 MG: 5 TABLET ORAL at 16:13

## 2017-09-29 RX ADMIN — ACETAMINOPHEN 975 MG: 325 TABLET, FILM COATED ORAL at 01:19

## 2017-09-29 RX ADMIN — IBUPROFEN 800 MG: 400 TABLET ORAL at 14:26

## 2017-09-29 RX ADMIN — OXYCODONE HYDROCHLORIDE 5 MG: 5 TABLET ORAL at 09:32

## 2017-09-29 RX ADMIN — KETOROLAC TROMETHAMINE 30 MG: 30 INJECTION, SOLUTION INTRAMUSCULAR at 07:23

## 2017-09-29 RX ADMIN — OXYCODONE HYDROCHLORIDE 10 MG: 5 TABLET ORAL at 12:54

## 2017-09-29 RX ADMIN — SENNOSIDES AND DOCUSATE SODIUM 2 TABLET: 8.6; 5 TABLET ORAL at 21:05

## 2017-09-29 NOTE — LACTATION NOTE
Initial Lactation visit. Hand out given. Recommend unlimited, frequent breast feedings: At least 8 - 12 times every 24 hours. Avoid pacifiers and supplementation with formula unless medically indicated. Explained benefits of holding baby skin on skin to help promote better breastfeeding outcomes.  Infant has been not feeding well at breast.  Very fussy overnight, started donor milk finger feeding after attempting breast feeding.  Pumping and getting drops of colostrum.  Shruti had a poor experience breast feeding her first baby, she never go milk in, he was dehydrated and was readmitted.  Encouraged her to keep pumping after feedings.  Started shield as baby was having difficulty latching during visit.  Infant latched better but was doing mostly non-nutritive sucking.  Had been given pacifier yesterday but did not use it overnight after education on pacifier use from her night nurse.   Used sns with donor milk with shield and baby fed much better with good coordinated suck.  Plan is to continue using shield, sns after feeding at breast for a few minutes and to continue pumping.  Infant was extremely irritable and fussy during beginning of feeding but did settle and calm down as feeding progressed.  Shruti was very patient and did very well calming baby and getting her to latch again.  Will revisit as needed.    Angeles Lindsey RN, IBCLC

## 2017-09-29 NOTE — PLAN OF CARE
Problem: Patient Care Overview  Goal: Plan of Care/Patient Progress Review  Outcome: No Change  VSS, fundus firm with scant flow. Incision intact with liquid bandage. Working on pain control. Pt started wearing abdominal binder for comfort. Using ice to incision and is taking scheduled tylenol and prn ibuprofen and oxycodone for pain. Pt's hgb today 8.8 today, Dr. Sheldon aware, no new orders. Pt has voided x 1 since ngo removal. Tolerating regular diet and good po intake. Working on breast feeding . Pt is breast feeding with a shield and giving donor breast milk via S&S. Patient is pumping after feedings.

## 2017-09-29 NOTE — PROGRESS NOTES
[facility]       DAILY NOTE - POSTPARTUM DAY 1     SUBJECTIVE:     Pain controlled? Yes  Tolerating a regular diet? YES  Ambulating? YES  Voiding without difficulty? Yes    OBJECTIVE:  Vitals:    17 2345 17 0000 17 0119 17 0340   BP:    102/50   Resp: 16 16 16 16   Temp:    97.5  F (36.4  C)   TempSrc:    Oral   SpO2:  98%  96%   Weight:       Height:           Constitutional: healthy, alert and no distress    Abdomen:  Uterine fundus is firm, non-tender and at the level of the umbilicus     Incision: Healing well      LABS:  Hemoglobin   Date Value Ref Range Status   2017 10.1 (L) 11.7 - 15.7 g/dL Final       ASSESSMENT:  Post-partum day #1 s/p  Section  Pregnancy complicated by NO COMPLICATIONS    Doing well.       PLAN:   Continue routine postpartum cares    Scotty Sheldon

## 2017-09-29 NOTE — PLAN OF CARE
Problem: Patient Care Overview  Goal: Plan of Care/Patient Progress Review  Outcome: Improving  VSS, Breastfeeding.  Fundus is firm at U/1, scant flow, no clots.  Lambert intact with adequate output.  Pain controlled with Duramorph, Toradol, Tylenol and Ice packs.  Tolerating clears, nausea has decreased with sclopamine patch and sips of clears.  Pt was able to stand at bedside for samuel-care, but unable to walk, as she felt weak and mildly dizzy.   and family at bedside and supportive.  Will continue to monitor and support.

## 2017-09-29 NOTE — PLAN OF CARE
Problem: Patient Care Overview  Goal: Plan of Care/Patient Progress Review  Outcome: No Change  VSS, Breastfeeding.  Fundus is firm at U/1, scant flow, no clots.  Lambert intact with adequate output. Gisele care cares done.  Pain controlled with  Toradol, Tylenol and Ice packs.  Tolerating clears,  No nausea  And sclopamine patch right ear and sips of clears. Patient walked to the bathroom and  Tolerated it well. Denies dizziness.. Sister  at bedside and supportive.  Will continue to monitor and support.

## 2017-09-30 LAB
ABO + RH BLD: NORMAL
ABO + RH BLD: NORMAL
BLOOD BANK CMNT PATIENT-IMP: NORMAL
DATE RH IMM GL GVN: NORMAL
FETAL CELL SCN BLD QL ROSETTE: NORMAL
RH IG VIALS RECOM PATIENT: NORMAL

## 2017-09-30 PROCEDURE — 25000128 H RX IP 250 OP 636: Performed by: OBSTETRICS & GYNECOLOGY

## 2017-09-30 PROCEDURE — 12000037 ZZH R&B POSTPARTUM INTERMEDIATE

## 2017-09-30 PROCEDURE — 25000132 ZZH RX MED GY IP 250 OP 250 PS 637: Performed by: OBSTETRICS & GYNECOLOGY

## 2017-09-30 PROCEDURE — 90707 MMR VACCINE SC: CPT | Performed by: OBSTETRICS & GYNECOLOGY

## 2017-09-30 RX ADMIN — OXYCODONE HYDROCHLORIDE 10 MG: 5 TABLET ORAL at 00:20

## 2017-09-30 RX ADMIN — OXYCODONE HYDROCHLORIDE 10 MG: 5 TABLET ORAL at 16:13

## 2017-09-30 RX ADMIN — MEASLES, MUMPS, AND RUBELLA VIRUS VACCINE LIVE 0.5 ML: 1000; 12500; 1000 INJECTION, POWDER, LYOPHILIZED, FOR SUSPENSION SUBCUTANEOUS at 10:15

## 2017-09-30 RX ADMIN — OXYCODONE HYDROCHLORIDE 10 MG: 5 TABLET ORAL at 03:35

## 2017-09-30 RX ADMIN — IBUPROFEN 800 MG: 400 TABLET ORAL at 10:04

## 2017-09-30 RX ADMIN — OXYCODONE HYDROCHLORIDE 10 MG: 5 TABLET ORAL at 13:14

## 2017-09-30 RX ADMIN — SENNOSIDES AND DOCUSATE SODIUM 2 TABLET: 8.6; 5 TABLET ORAL at 20:42

## 2017-09-30 RX ADMIN — OXYCODONE HYDROCHLORIDE 10 MG: 5 TABLET ORAL at 10:06

## 2017-09-30 RX ADMIN — ACETAMINOPHEN 975 MG: 325 TABLET, FILM COATED ORAL at 01:36

## 2017-09-30 RX ADMIN — IBUPROFEN 800 MG: 400 TABLET ORAL at 16:14

## 2017-09-30 RX ADMIN — OXYCODONE HYDROCHLORIDE 10 MG: 5 TABLET ORAL at 20:42

## 2017-09-30 RX ADMIN — ACETAMINOPHEN 975 MG: 325 TABLET, FILM COATED ORAL at 17:57

## 2017-09-30 RX ADMIN — ACETAMINOPHEN 975 MG: 325 TABLET, FILM COATED ORAL at 09:02

## 2017-09-30 RX ADMIN — HUMAN RHO(D) IMMUNE GLOBULIN 300 MCG: 300 INJECTION, SOLUTION INTRAMUSCULAR at 10:08

## 2017-09-30 RX ADMIN — SENNOSIDES AND DOCUSATE SODIUM 1 TABLET: 8.6; 5 TABLET ORAL at 09:02

## 2017-09-30 RX ADMIN — OXYCODONE HYDROCHLORIDE 10 MG: 5 TABLET ORAL at 06:38

## 2017-09-30 RX ADMIN — IBUPROFEN 800 MG: 400 TABLET ORAL at 03:36

## 2017-09-30 RX ADMIN — PRENATAL VIT W/ FE FUMARATE-FA TAB 27-0.8 MG 1 TABLET: 27-0.8 TAB at 17:57

## 2017-09-30 NOTE — PLAN OF CARE
Problem: Patient Care Overview  Goal: Plan of Care/Patient Progress Review  Outcome: Improving  VSS, breastfeeding with shield.  Fundus is firm at U/1, scant flow, no clots.  Incision is ROXANE, steri-strips intact, no drainage.  Pain treated with Tylenol, Ibuprofen, Oxy (10), ice packs and abdominal binder.  Pt c/o left sided ligament pain with movement/activity at times.  Independent with cares.   is at bedside and supportive.  Will continue to monitor and support.

## 2017-09-30 NOTE — LACTATION NOTE
Routine visit. Pt states breastfeeding is improving. She states she was using a shield yesterday but infant is now latching well without a shield and feeding for approx 15-20 mins per side. Infant latched and feeding well on left side at time of visit. Shruti is then giving donor milk via a SNS after feedings and pumping. She states she's seeing drops of colostrum with pumping. Discussed importance of paying close attention to infant's output and weight with her hx of her milk not coming in. Discussed donor milk for purchase as an option for supplementing once at home. Shruti states she plans to use formula once at home if her milk is not in. Will revisit as needed.

## 2017-09-30 NOTE — PLAN OF CARE
Problem: Patient Care Overview  Goal: Plan of Care/Patient Progress Review  Outcome: Improving  VSS, breastfeeding every 2-3 hours with nipple shield, finger feeding 20 mL of donor milk following feeds, using lanolin for tender and red nipples, incision CDI with steri strips, Tylenol, Ibuprofen and Oxycodone given for pain, pt states she feels a stabbing pain to the left of the incision while ambulating, rates this pain 10/10, relieved by medication and rest,ambulating with no signs of dizziness,  at bedside, will continue to monitor.

## 2017-09-30 NOTE — PLAN OF CARE
Problem: Patient Care Overview  Goal: Plan of Care/Patient Progress Review  Outcome: No Change  Vss, fundus is firm with scant flow. Incision intact with liquid bandage. Pt up and showered today. Working on pain management. Pt wearing abdominal binder for comfort. States pain is 8/10 on left side when up and out of bed. Pt is taking 10 mg oxycodone every 3 hours, 800mg ibuprofen every 6 hrs and scheduled tylenol every 8 hrs. Pt is comfortable when lying in bed. Patient is breast feeding  and pumping after feedings due to history of low milk supply.

## 2017-09-30 NOTE — PROGRESS NOTES
[facility]       DAILY NOTE - POSTPARTUM DAY 2     SUBJECTIVE:     Pain controlled? Yes  Tolerating a regular diet? YES  Ambulating? YES  Voiding without difficulty? Yes    OBJECTIVE:  Vitals:    17 1253 17 1733 17 0015 17 0430   BP: 112/56 107/57 102/56    Pulse: 56      Resp:  16 16 16   Temp: 97.9  F (36.6  C) 97.6  F (36.4  C) 98  F (36.7  C)    TempSrc: Oral Oral Oral    SpO2:       Weight:       Height:           Constitutional: healthy, alert and no distress    Abdomen:  Uterine fundus is firm, non-tender and at the level of the umbilicus     Incision: Healing well      LABS:  Hemoglobin   Date Value Ref Range Status   2017 8.8 (L) 11.7 - 15.7 g/dL Final   2017 10.1 (L) 11.7 - 15.7 g/dL Final       ASSESSMENT:  Post-partum day #2 s/p  Section  Pregnancy complicated by NO COMPLICATIONS    Normal healing wound.  Pain well-controlled.       PLAN:   Continue routine postpartum cares  Anticipate discharge tomorrow  She has mild anemia but suffers constipation and has a RV fistula repair. Ok to restart OTC iron after percocet stops once she is at home.    Scotty Sheldon

## 2017-10-01 VITALS
RESPIRATION RATE: 16 BRPM | WEIGHT: 215 LBS | BODY MASS INDEX: 33.74 KG/M2 | TEMPERATURE: 97.6 F | DIASTOLIC BLOOD PRESSURE: 67 MMHG | HEART RATE: 56 BPM | SYSTOLIC BLOOD PRESSURE: 113 MMHG | HEIGHT: 67 IN | OXYGEN SATURATION: 96 %

## 2017-10-01 PROCEDURE — 25000132 ZZH RX MED GY IP 250 OP 250 PS 637: Performed by: OBSTETRICS & GYNECOLOGY

## 2017-10-01 PROCEDURE — 25000128 H RX IP 250 OP 636: Performed by: OBSTETRICS & GYNECOLOGY

## 2017-10-01 PROCEDURE — 90686 IIV4 VACC NO PRSV 0.5 ML IM: CPT | Performed by: OBSTETRICS & GYNECOLOGY

## 2017-10-01 RX ORDER — OXYCODONE HYDROCHLORIDE 5 MG/1
5-10 TABLET ORAL
Qty: 30 TABLET | Refills: 0 | Status: SHIPPED | OUTPATIENT
Start: 2017-10-01

## 2017-10-01 RX ORDER — AMOXICILLIN 250 MG
1-2 CAPSULE ORAL 2 TIMES DAILY
Qty: 30 TABLET | Refills: 1 | Status: SHIPPED | OUTPATIENT
Start: 2017-10-01

## 2017-10-01 RX ORDER — IBUPROFEN 800 MG/1
800 TABLET, FILM COATED ORAL EVERY 6 HOURS PRN
Qty: 30 TABLET | Refills: 0 | Status: SHIPPED | OUTPATIENT
Start: 2017-10-01

## 2017-10-01 RX ADMIN — INFLUENZA A VIRUS A/MICHIGAN/45/2015 X-275 (H1N1) ANTIGEN (FORMALDEHYDE INACTIVATED), INFLUENZA A VIRUS A/HONG KONG/4801/2014 X-263B (H3N2) ANTIGEN (FORMALDEHYDE INACTIVATED), INFLUENZA B VIRUS B/PHUKET/3073/2013 ANTIGEN (FORMALDEHYDE INACTIVATED), AND INFLUENZA B VIRUS B/BRISBANE/60/2008 ANTIGEN (FORMALDEHYDE INACTIVATED) 0.5 ML: 15; 15; 15; 15 INJECTION, SUSPENSION INTRAMUSCULAR at 06:49

## 2017-10-01 RX ADMIN — OXYCODONE HYDROCHLORIDE 10 MG: 5 TABLET ORAL at 06:50

## 2017-10-01 RX ADMIN — SENNOSIDES AND DOCUSATE SODIUM 1 TABLET: 8.6; 5 TABLET ORAL at 10:19

## 2017-10-01 RX ADMIN — OXYCODONE HYDROCHLORIDE 10 MG: 5 TABLET ORAL at 03:48

## 2017-10-01 RX ADMIN — OXYCODONE HYDROCHLORIDE 10 MG: 5 TABLET ORAL at 00:17

## 2017-10-01 RX ADMIN — ACETAMINOPHEN 975 MG: 325 TABLET, FILM COATED ORAL at 02:01

## 2017-10-01 RX ADMIN — IBUPROFEN 800 MG: 400 TABLET ORAL at 06:50

## 2017-10-01 RX ADMIN — OXYCODONE HYDROCHLORIDE 10 MG: 5 TABLET ORAL at 10:19

## 2017-10-01 RX ADMIN — IBUPROFEN 800 MG: 400 TABLET ORAL at 00:17

## 2017-10-01 RX ADMIN — ACETAMINOPHEN 975 MG: 325 TABLET, FILM COATED ORAL at 10:19

## 2017-10-01 RX ADMIN — OXYCODONE HYDROCHLORIDE 10 MG: 5 TABLET ORAL at 13:51

## 2017-10-01 RX ADMIN — IBUPROFEN 800 MG: 400 TABLET ORAL at 13:51

## 2017-10-01 NOTE — PLAN OF CARE
Problem:  Delivery (Adult,Obstetrics,Pediatric)  Goal: Signs and Symptoms of Listed Potential Problems Will be Absent, Minimized or Managed ( Delivery)  Signs and symptoms of listed potential problems will be absent, minimized or managed by discharge/transition of care (reference  Delivery (Adult,Obstetrics,Pediatric) CPG).   Outcome: Adequate for Discharge Date Met:  10/01/17  D: VSS, assessments WDL.   I: Pt. received complete discharge paperwork and home medications as filled by discharge pharmacy to include oxycodone, ibuprofen and senna.  Pt provided with breast pump for home use. Pt. was given times of last dose for all discharge medications in writing on discharge medication sheets.  Discharge teaching included home medication, pain management, activity restrictions, postpartum cares, and signs and symptoms of infection.    A: Discharge outcomes on care plan met.  Mother states understanding and comfort with self and baby cares.  P: Pt. discharged to home.  Pt. was discharged with baby, and bands were checked at time of discharge.  Pt. was accompanied by , nurse and baby, and left with personal belongings.  Home care ordered.  Pt. to follow up with OB per MD order.  Pt. had no further questions at the time of discharge and no unmet needs were identified.

## 2017-10-01 NOTE — PROGRESS NOTES
[facility]   Obstetrics Post-Op / Progress Note         Assessment and Plan:    Assessment:   Post-operative day #3  Low transverse primary  section  L&D complications: None      Doing well.  Clean wound without signs of infection.  Normal healing wound.  No immediate surgical complications identified.  No excessive bleeding      Plan:   Discharge later today            Interval History:   Doing well.  Pain is well-controlled.  No fevers.  No history of wound drainage, warmth or significant erythema.  Good appetite.  Denies chest pain, shortness of breath, nausea or vomiting.  Ambulatory.              Significant Problems:    None          Review of Systems:    The patient denies any chest pain, shortness of breath, excessive pain, fever, chills, purulent drainage from the wound, nausea or vomiting.          Medications:   All medications related to the patient's surgery have been reviewed          Physical Exam:   All vitals stable  Wound clean and dry with minimal or no drainage.  Surrounding skin with minimal erythema.          Data:       Scotty Napier MD, MD

## 2017-10-01 NOTE — LACTATION NOTE
Follow up visit. Shruti states breastfeeding is continuing to go well. She's still pumping after each feeding and seeing increasing amounts of EBM, then finger feeding up to 25mls of donor milk. Discharging home today. She plans to supplement with formula once at home. Discussed options for supplementing including SNS, finger feeding and bottling. Pt states she's comfortable discharging home with current feeding plan. Discussed continuing to document feeds, voids and stools on feeding log once at home. Recommended pt follow up with an IBCLC outpatient within the next week d/t hx of low milk supply.

## 2017-10-01 NOTE — PLAN OF CARE
Problem: Patient Care Overview  Goal: Plan of Care/Patient Progress Review  Outcome: Improving  VSS, Breastfeeding with shield.  Fundus is firm at U/1, scant flow, no clots.  Incision is ROXANE, liquid bandage, no drainage.  Pain controlled with Tylenol, Ibuprofen, Oxy (10), and abdominal binder.  Independent with cares, and walking encouraged.  Family support at bedside continuously.  Will continue to monitor and support.

## 2017-10-01 NOTE — PLAN OF CARE
Problem: Patient Care Overview  Goal: Plan of Care/Patient Progress Review  Outcome: Improving  VSS, working on breastfeeding every 2-3 hours with shield, finger feeding 20 mL DM following feeds, incision CDI, abdominal binder, Tylenol, Ibuprofen and Oxycodone given for pain, ambulating well, pts mother at bedside, will continue to monitor.

## 2017-10-02 LAB — RUBV IGG SERPL IA-ACNC: 63 IU/ML

## 2017-10-12 NOTE — DISCHARGE SUMMARY
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             Discharge Medications:     Discharge Medication List as of 10/1/2017  1:57 PM      START taking these medications    Details   oxyCODONE (ROXICODONE) 5 MG IR tablet Take 1-2 tablets (5-10 mg) by mouth every 3 hours as needed for moderate to severe pain, Disp-30 tablet, R-0, Local Print      ibuprofen (ADVIL/MOTRIN) 800 MG tablet Take 1 tablet (800 mg) by mouth every 6 hours as needed for other (cramping), Disp-30 tablet, R-0, Local Print      senna-docusate (SENOKOT-S;PERICOLACE) 8.6-50 MG per tablet Take 1-2 tablets by mouth 2 times daily, Disp-30 tablet, R-1, Local Print         CONTINUE these medications which have NOT CHANGED    Details   Prenatal Vit-Fe Fumarate-FA (PRENATAL MULTIVITAMIN  PLUS IRON) 27-0.8 MG TABS per tablet Take 1 tablet by mouth daily, Historical      calcium-magnesium (CALMAG) 500-250 MG TABS per tablet Take 2 tablets by mouth daily, Historical         STOP taking these medications       zinc sulfate (ZINCATE) 220 (50 ZN) MG capsule Comments:   Reason for Stopping:                     Consultations:   No consultations were requested during this admission          Brief History of Illness:   Reason for admission requiring a surgical or invasive procedure:   HISTORY OF 4TH DEGREE WITH RECTOVAGINAL  FISTULA   The patient underwent the following procedure(s):   LTCS   There were no immediate complications during this procedure.    Please refer to the full operative summary for details.             Hospital Course:   The patient's hospital course was unremarkable.  She recovered as anticipated and experienced no post-operative complications.           Discharge Instructions and Follow-Up:   Discharge diet: Regular   Discharge activity: No heavy lifting, pushing, pulling for 6 week(s)  Pelvic rest: abstain from intercourse and do not use tampons for 6 week(s)   Discharge follow-up: Follow up with me in 2 weeks   Wound care: Keep wound clean and dry           Discharge Disposition:   Discharged to home      Attestation:  I have reviewed today's vital signs, notes, medications, labs and imaging.  Amount of time performed on this discharge summary: 15 minutes.    Scotty Sheldon MD

## 2018-06-09 ENCOUNTER — HOSPITAL ENCOUNTER (EMERGENCY)
Facility: CLINIC | Age: 28
Discharge: HOME | End: 2018-06-09
Attending: EMERGENCY MEDICINE | Admitting: EMERGENCY MEDICINE
Payer: COMMERCIAL

## 2018-06-09 ENCOUNTER — APPOINTMENT (OUTPATIENT)
Dept: CT IMAGING | Facility: CLINIC | Age: 28
End: 2018-06-09
Attending: EMERGENCY MEDICINE
Payer: COMMERCIAL

## 2018-06-09 VITALS
HEIGHT: 67 IN | WEIGHT: 200 LBS | HEART RATE: 88 BPM | SYSTOLIC BLOOD PRESSURE: 134 MMHG | DIASTOLIC BLOOD PRESSURE: 76 MMHG | TEMPERATURE: 99.2 F | OXYGEN SATURATION: 100 % | RESPIRATION RATE: 18 BRPM | BODY MASS INDEX: 31.39 KG/M2

## 2018-06-09 DIAGNOSIS — N30.91 HEMORRHAGIC CYSTITIS: ICD-10-CM

## 2018-06-09 LAB
ALBUMIN UR-MCNC: 300 MG/DL
APPEARANCE UR: CLEAR
BILIRUB UR QL STRIP: NEGATIVE
COLOR UR AUTO: YELLOW
GLUCOSE UR STRIP-MCNC: NEGATIVE MG/DL
HCG UR QL: NEGATIVE
HGB UR QL STRIP: ABNORMAL
KETONES UR STRIP-MCNC: NEGATIVE MG/DL
LEUKOCYTE ESTERASE UR QL STRIP: NEGATIVE
MUCOUS THREADS #/AREA URNS LPF: PRESENT /LPF
NITRATE UR QL: NEGATIVE
PH UR STRIP: 6.5 PH (ref 5–7)
RBC #/AREA URNS AUTO: >182 /HPF (ref 0–2)
SOURCE: ABNORMAL
SP GR UR STRIP: 1.02 (ref 1–1.03)
SQUAMOUS #/AREA URNS AUTO: 18 /HPF (ref 0–1)
UROBILINOGEN UR STRIP-MCNC: NORMAL MG/DL (ref 0–2)
WBC #/AREA URNS AUTO: >182 /HPF (ref 0–5)

## 2018-06-09 PROCEDURE — 81001 URINALYSIS AUTO W/SCOPE: CPT | Performed by: EMERGENCY MEDICINE

## 2018-06-09 PROCEDURE — 74176 CT ABD & PELVIS W/O CONTRAST: CPT

## 2018-06-09 PROCEDURE — 99284 EMERGENCY DEPT VISIT MOD MDM: CPT | Mod: 25

## 2018-06-09 PROCEDURE — 81025 URINE PREGNANCY TEST: CPT | Performed by: EMERGENCY MEDICINE

## 2018-06-09 RX ORDER — CIPROFLOXACIN 500 MG/1
500 TABLET, FILM COATED ORAL 2 TIMES DAILY
Qty: 10 TABLET | Refills: 0 | Status: SHIPPED | OUTPATIENT
Start: 2018-06-09 | End: 2018-06-14

## 2018-06-09 ASSESSMENT — ENCOUNTER SYMPTOMS
DIFFICULTY URINATING: 1
BACK PAIN: 1
FREQUENCY: 1
FLANK PAIN: 0
HEMATURIA: 1
DYSURIA: 1

## 2018-06-09 NOTE — ED AVS SNAPSHOT
Emergency Department    [address] [place] [address]    Phone:  [phone]    Fax:  [phone]                                       Shruti Gamino   MRN: [id number]    Department:   Emergency Department   Date of Visit:  6/9/2018           After Visit Summary Signature Page     I have received my discharge instructions, and my questions have been answered. I have discussed any challenges I see with this plan with the nurse or doctor.    ..........................................................................................................................................  Patient/Patient Representative Signature      ..........................................................................................................................................  Patient Representative Print Name and Relationship to Patient    ..................................................               ................................................  Date                                            Time    ..........................................................................................................................................  Reviewed by Signature/Title    ...................................................              ..............................................  Date                                                            Time

## 2018-06-09 NOTE — DISCHARGE INSTRUCTIONS
Understanding Urinary Tract Infections (UTIs)  Most UTIs are caused by bacteria, although they may also be caused by viruses or fungi. Bacteria from the bowel are the most common source of infection. The infection may start because of any of the following:    Sexual activity. During sex, bacteria can travel from the penis, vagina, or rectum into the urethra.     Bacteria on the skin outside the rectum may travel into the urethra. This is more common in women since the rectum and urethra are closer to each other than in men. Wiping from front to back after using the toilet and keeping the area clean can help prevent germs from getting to the urethra.    Blockage of urine flow through the urinary tract. If urine sits too long, germs may start to grow out of control.      Parts of the urinary tract  The infection can occur in any part of the urinary tract.    The kidneys collect and store urine.    The ureters carry urine from the kidneys to the bladder.    The bladder holds urine until you are ready to let it out.    The urethra carries urine from the bladder out of the body. It is shorter in women, so bacteria can move through it more easily. The urethra is longer in men, so a UTI is less likely to reach the bladder or kidneys in men.  Date Last Reviewed: 1/1/2017 2000-2017 [employer]. [address] [place] [address], [place] [address]. All rights reserved. This information is not intended as a substitute for professional medical care. Always follow your healthcare professional's instructions.

## 2018-06-09 NOTE — ED PROVIDER NOTES
History     Chief Complaint:    Hematuria (bloody urine started tonight, hx fequent uti)       HPI   Shruti Gamino is a 28 year old female who presents with urinary frequency, hematuria and mild low back pain.  Patient had problems with UTIs during her last pregnancy but denies being pregnant at this time.  She denies fevers or chills.  Patient notes significant hematuria tonight and has no history of kidney stones.  Denies vomiting or weakness.    Allergies:        Medications:      calcium-magnesium (CALMAG) 500-250 MG TABS per tablet   ibuprofen (ADVIL/MOTRIN) 800 MG tablet   oxyCODONE (ROXICODONE) 5 MG IR tablet   Prenatal Vit-Fe Fumarate-FA (PRENATAL MULTIVITAMIN  PLUS IRON) 27-0.8 MG TABS per tablet   senna-docusate (SENOKOT-S;PERICOLACE) 8.6-50 MG per tablet       Past Medical History:    Past Medical History:   Diagnosis Date     UTI (urinary tract infection)        Patient Active Problem List    Diagnosis Date Noted      delivery delivered 2017     Priority: Medium     Decreased fetal movement 2017     Priority: Medium        Past Surgical History:    Past Surgical History:   Procedure Laterality Date      SECTION N/A 2017    Procedure:  SECTION;  PRIMARY  SECTION;  Surgeon: Scotty Sheldon MD;  Location:  L+D     CHOLECYSTECTOMY       DILATION AND CURETTAGE          Family History:    family history is not on file.    Social History:   reports that she has never smoked. She has never used smokeless tobacco. She reports that she does not drink alcohol or use illicit drugs.    PCP: No primary care provider on file.     Review of Systems   Genitourinary: Positive for difficulty urinating, dysuria, frequency and hematuria. Negative for flank pain.   Musculoskeletal: Positive for back pain.   All other systems reviewed and are negative.        Physical Exam     No data found.       Physical Exam     GENERAL: well developed, pleasant  HEAD:  atraumatic  EYES: pupils reactive, extraocular muscles intact, conjunctivae normal  ENT:  mucus membranes moist  NECK:  trachea midline, normal range of motion  ABDOMEN: soft, nontender, non-distention, no CVA tenderness  MUSCULOSKELETAL: no deformities  SKIN: warm and dry, no acute rashes or ulceration  NEURO: GCS 15, cranial nerves intact, alert and oriented x3  PSYCH:  Mood/affect normal        Emergency Department Course     Abd/pelvis CT no contrast - Stone Protocol   Final Result   IMPRESSION:   1. No urinary stones or hydronephrosis.   2. No other acute findings.       HERNÁN PALACIOS MD              Laboratory:  Labs Ordered and Resulted from Time of ED Arrival Up to the Time of Departure from the ED   ROUTINE UA WITH MICROSCOPIC - Abnormal; Notable for the following:        Result Value    Blood Urine Moderate (*)     Protein Albumin Urine 300 (*)     WBC Urine >182 (*)     RBC Urine >182 (*)     Squamous Epithelial /HPF Urine 18 (*)     Mucous Urine Present (*)     All other components within normal limits   HCG QUALITATIVE URINE       Labs Ordered and Resulted from Time of ED Arrival Up to the Time of Departure from the ED   ROUTINE UA WITH MICROSCOPIC - Abnormal; Notable for the following:        Result Value    Blood Urine Moderate (*)     Protein Albumin Urine 300 (*)     WBC Urine >182 (*)     RBC Urine >182 (*)     Squamous Epithelial /HPF Urine 18 (*)     Mucous Urine Present (*)     All other components within normal limits   HCG QUALITATIVE URINE            Interventions:      Emergency Department Course:  Past medical records, nursing notes, and vitals reviewed.  I performed an exam of the patient and obtained history, as documented above.    I rechecked the patient. Findings and plan explained to the Patient.     Impression & Plan           Medical Decision Making:  Patient presents with urinary symptoms of frequency, urgency and hematuria with low back pain.  CT was obtained and is negative  for kidney stones or other acute pathology.  Her urine shows significant WBC and RBC although negative for nitrite and leukocyte esterase.  She recently was on a trip with her  so likely UTI although acouple of findings would argue against this.  Given her urinary symptoms, abnormal UA and negative CT, will treat like UTI and follow up if no improvement.        Diagnosis:    ICD-10-CM    1. Hemorrhagic cystitis N30.91         Discharge Medications:  Discharge Medication List as of 6/9/2018  5:35 AM      START taking these medications    Details   ciprofloxacin (CIPRO) 500 MG tablet Take 1 tablet (500 mg) by mouth 2 times daily for 5 days, Disp-10 tablet, R-0, Local Print              6/9/2018   Bret Castro MD Adams, Shaun L, MD  06/20/18 1046

## 2018-06-09 NOTE — ED AVS SNAPSHOT
Emergency Department    [address] [place] [address]    Phone:  [phone]    Fax:  [phone]                                       Shruti Gamino   MRN: [id number]    Department:   Emergency Department   Date of Visit:  6/9/2018           Patient Information     Date Of Birth          1990        Your diagnoses for this visit were:     Hemorrhagic cystitis        You were seen by Bret Castro MD.      Follow-up Information     Follow up with your primary MD.        Discharge Instructions         Understanding Urinary Tract Infections (UTIs)  Most UTIs are caused by bacteria, although they may also be caused by viruses or fungi. Bacteria from the bowel are the most common source of infection. The infection may start because of any of the following:    Sexual activity. During sex, bacteria can travel from the penis, vagina, or rectum into the urethra.     Bacteria on the skin outside the rectum may travel into the urethra. This is more common in women since the rectum and urethra are closer to each other than in men. Wiping from front to back after using the toilet and keeping the area clean can help prevent germs from getting to the urethra.    Blockage of urine flow through the urinary tract. If urine sits too long, germs may start to grow out of control.      Parts of the urinary tract  The infection can occur in any part of the urinary tract.    The kidneys collect and store urine.    The ureters carry urine from the kidneys to the bladder.    The bladder holds urine until you are ready to let it out.    The urethra carries urine from the bladder out of the body. It is shorter in women, so bacteria can move through it more easily. The urethra is longer in men, so a UTI is less likely to reach the bladder or kidneys in men.  Date Last Reviewed: 1/1/2017 2000-2017 [employer]. [address] [place] [address], [place] [address]. All rights reserved. This information is not  intended as a substitute for professional medical care. Always follow your healthcare professional's instructions.          24 Hour Appointment Hotline       To make an appointment at any [facility], call [phone] ([phone]). If you don't have a family doctor or clinic, we will help you find one. [facility] clinics are conveniently located to serve the needs of you and your family.             Review of your medicines      START taking        Dose / Directions Last dose taken    ciprofloxacin 500 MG tablet   Commonly known as:  CIPRO   Dose:  500 mg   Quantity:  10 tablet        Take 1 tablet (500 mg) by mouth 2 times daily for 5 days   Refills:  0          Our records show that you are taking the medicines listed below. If these are incorrect, please call your family doctor or clinic.        Dose / Directions Last dose taken    calcium-magnesium 500-250 MG Tabs per tablet   Commonly known as:  CALMAG   Dose:  2 tablet        Take 2 tablets by mouth daily   Refills:  0        ibuprofen 800 MG tablet   Commonly known as:  ADVIL/MOTRIN   Dose:  800 mg   Quantity:  30 tablet        Take 1 tablet (800 mg) by mouth every 6 hours as needed for other (cramping)   Refills:  0        oxyCODONE IR 5 MG tablet   Commonly known as:  ROXICODONE   Dose:  5-10 mg   Quantity:  30 tablet        Take 1-2 tablets (5-10 mg) by mouth every 3 hours as needed for moderate to severe pain   Refills:  0        prenatal multivitamin plus iron 27-0.8 MG Tabs per tablet   Dose:  1 tablet   Indication:  Pregnancy        Take 1 tablet by mouth daily   Refills:  0        senna-docusate 8.6-50 MG per tablet   Commonly known as:  SENOKOT-S;PERICOLACE   Dose:  1-2 tablet   Quantity:  30 tablet        Take 1-2 tablets by mouth 2 times daily   Refills:  1                Prescriptions were sent or printed at these locations (1 Prescription)                   Other Prescriptions                Printed at Department/Unit printer (1 of 1)          ciprofloxacin (CIPRO) 500 MG tablet                Procedures and tests performed during your visit     Abd/pelvis CT no contrast - Stone Protocol    HCG qualitative urine    UA with Microscopic      Orders Needing Specimen Collection     None      Pending Results     Date and Time Order Name Status Description    6/9/2018 0418 Abd/pelvis CT no contrast - Stone Protocol Preliminary             Pending Culture Results     No orders found from 6/7/2018 to 6/10/2018.            Pending Results Instructions     If you had any lab results that were not finalized at the time of your Discharge, you can call the ED Lab Result RN at [phone]. You will be contacted by this team for any positive Lab results or changes in treatment. The nurses are available 7 days a week from 10A to 6:30P.  You can leave a message 24 hours per day and they will return your call.        Test Results From Your Hospital Stay        6/9/2018  4:11 AM      Component Results     Component Value Ref Range & Units Status    Color Urine Yellow  Final    Appearance Urine Clear  Final    Glucose Urine Negative NEG^Negative mg/dL Final    Bilirubin Urine Negative NEG^Negative Final    Ketones Urine Negative NEG^Negative mg/dL Final    Specific Gravity Urine 1.016 1.003 - 1.035 Final    Blood Urine Moderate (A) NEG^Negative Final    pH Urine 6.5 5.0 - 7.0 pH Final    Protein Albumin Urine 300 (A) NEG^Negative mg/dL Final    Urobilinogen mg/dL Normal 0.0 - 2.0 mg/dL Final    Nitrite Urine Negative NEG^Negative Final    Leukocyte Esterase Urine Negative NEG^Negative Final    Source Midstream Urine  Final    WBC Urine >182 (H) 0 - 5 /HPF Final    RBC Urine >182 (H) 0 - 2 /HPF Final    Squamous Epithelial /HPF Urine 18 (H) 0 - 1 /HPF Final    Mucous Urine Present (A) NEG^Negative /LPF Final         6/9/2018  4:14 AM      Component Results     Component Value Ref Range & Units Status    HCG Qual Urine Negative NEG^Negative Final    This test is for  screening purposes.  Results should be interpreted along with   the clinical picture.  Confirmation testing is available if warranted by   ordering [id number], HCG Quantitative Pregnancy.           6/9/2018  5:09 AM      Narrative     CT ABDOMEN PELVIS W/O CONTRAST  6/9/2018 4:39 AM     INDICATION: Flank pain, hematuria, urinary tract infection, stone,  pyelonephritis.       TECHNIQUE: Thin axial images through the abdomen and pelvis without  contrast. Coronal reformatted images. Radiation dose for this scan was  reduced using automated exposure control, adjustment of the mA and/or  kV according to patient size, or iterative reconstruction technique.    COMPARISON: None.    FINDINGS: No urinary stones or hydronephrosis. The gallbladder is  absent. Liver, spleen, pancreas, adrenal glands, and kidneys are  negative without benefit of contrast.    Visualized lung bases are clear. Small fat-containing periumbilical  hernia.    Uterus is present. No suspicious adnexal masses. Negative appendix. No  bowel obstruction or ascites. No free air. Moderate stool in the  colon.        Impression     IMPRESSION:  1. No urinary stones or hydronephrosis.  2. No other acute findings.                 Clinical Quality Measure: Blood Pressure Screening     Your blood pressure was checked while you were in the emergency department today. The last reading we obtained was  BP: 134/76 . Please read the guidelines below about what these numbers mean and what you should do about them.  If your systolic blood pressure (the top number) is less than 120 and your diastolic blood pressure (the bottom number) is less than 80, then your blood pressure is normal. There is nothing more that you need to do about it.  If your systolic blood pressure (the top number) is 120-139 or your diastolic blood pressure (the bottom number) is 80-89, your blood pressure may be higher than it should be. You should have your blood pressure rechecked within a year by a  "primary care provider.  If your systolic blood pressure (the top number) is 140 or greater or your diastolic blood pressure (the bottom number) is 90 or greater, you may have high blood pressure. High blood pressure is treatable, but if left untreated over time it can put you at risk for heart attack, stroke, or kidney failure. You should have your blood pressure rechecked by a primary care provider within the next 4 weeks.  If your provider in the emergency department today gave you specific instructions to follow-up with your doctor or provider even sooner than that, you should follow that instruction and not wait for up to 4 weeks for your follow-up visit.        Thank you for choosing [facility]       Thank you for choosing [facility] for your care. Our goal is always to provide you with excellent care. Hearing back from our patients is one way we can continue to improve our services. Please take a few minutes to complete the written survey that you may receive in the mail after you visit with us. Thank you!        [facility] Information     [facility] lets you send messages to your doctor, view your test results, renew your prescriptions, schedule appointments and more. To sign up, go to [email or web address] . Click on \"Log in\" on the left side of the screen, which will take you to the Welcome page. Then click on \"Sign up Now\" on the right side of the page.     You will be asked to enter the access code listed below, as well as some personal information. Please follow the directions to create your username and password.     Your access code is: [email or web address]  Expires: 2018  5:34 AM     Your access code will  in 90 days. If you need help or a new code, please call your [facility] clinic or [phone].        Care EveryWhere ID     This is your Care EveryWhere ID. This could be used by other organizations to access your [facility] medical records  [id number]        Equal Access to Services     FLOR HERNANDEZ AH: " Hadii gabriella Ocampo, wafarzanada jamison, qasnehalta kaaljohn romano. So [facility] [phone].    ATENCIÓN: Si habla español, tiene a dutta disposición servicios gratuitos de asistencia lingüística. Llame al [phone].    We comply with applicable federal civil rights laws and [place] laws. We do not discriminate on the basis of race, color, national origin, age, disability, sex, sexual orientation, or gender identity.            After Visit Summary       This is your record. Keep this with you and show to your community pharmacist(s) and doctor(s) at your next visit.

## 2022-02-04 ENCOUNTER — LAB REQUISITION (OUTPATIENT)
Dept: LAB | Facility: CLINIC | Age: 32
End: 2022-02-04
Payer: COMMERCIAL

## 2022-02-04 PROCEDURE — 88307 TISSUE EXAM BY PATHOLOGIST: CPT | Mod: TC,ORL | Performed by: OBSTETRICS & GYNECOLOGY

## 2022-02-07 LAB
PATH REPORT.COMMENTS IMP SPEC: NORMAL
PATH REPORT.COMMENTS IMP SPEC: NORMAL
PATH REPORT.FINAL DX SPEC: NORMAL
PATH REPORT.GROSS SPEC: NORMAL
PATH REPORT.MICROSCOPIC SPEC OTHER STN: NORMAL
PATH REPORT.RELEVANT HX SPEC: NORMAL
PHOTO IMAGE: NORMAL

## 2022-02-07 PROCEDURE — 88307 TISSUE EXAM BY PATHOLOGIST: CPT | Mod: 26 | Performed by: PATHOLOGY

## 2024-07-02 ENCOUNTER — LAB REQUISITION (OUTPATIENT)
Dept: LAB | Facility: CLINIC | Age: 34
End: 2024-07-02

## 2024-07-02 DIAGNOSIS — M25.50 PAIN IN UNSPECIFIED JOINT: ICD-10-CM

## 2024-07-02 DIAGNOSIS — R53.82 CHRONIC FATIGUE, UNSPECIFIED: ICD-10-CM

## 2024-07-02 LAB
CRP SERPL-MCNC: 8.64 MG/L
ERYTHROCYTE [SEDIMENTATION RATE] IN BLOOD BY WESTERGREN METHOD: 25 MM/HR (ref 0–20)
FSH SERPL IRP2-ACNC: 2.8 MIU/ML
RHEUMATOID FACT SERPL-ACNC: <10 IU/ML

## 2024-07-02 PROCEDURE — 86140 C-REACTIVE PROTEIN: CPT | Performed by: OBSTETRICS & GYNECOLOGY

## 2024-07-02 PROCEDURE — 86431 RHEUMATOID FACTOR QUANT: CPT | Performed by: OBSTETRICS & GYNECOLOGY

## 2024-07-02 PROCEDURE — 86038 ANTINUCLEAR ANTIBODIES: CPT | Performed by: OBSTETRICS & GYNECOLOGY

## 2024-07-02 PROCEDURE — 85652 RBC SED RATE AUTOMATED: CPT | Performed by: OBSTETRICS & GYNECOLOGY

## 2024-07-02 PROCEDURE — 83001 ASSAY OF GONADOTROPIN (FSH): CPT | Performed by: OBSTETRICS & GYNECOLOGY

## 2024-07-03 LAB
ANA PAT SER IF-IMP: ABNORMAL
ANA SER QL IF: POSITIVE
ANA TITR SER IF: ABNORMAL {TITER}

## 2024-10-23 ENCOUNTER — LAB REQUISITION (OUTPATIENT)
Dept: LAB | Facility: CLINIC | Age: 34
End: 2024-10-23

## 2024-10-23 DIAGNOSIS — R53.83 OTHER FATIGUE: ICD-10-CM

## 2024-10-23 LAB
ERYTHROCYTE [DISTWIDTH] IN BLOOD BY AUTOMATED COUNT: 12.8 % (ref 10–15)
HCT VFR BLD AUTO: 43.2 % (ref 35–47)
HGB BLD-MCNC: 13.8 G/DL (ref 11.7–15.7)
MCH RBC QN AUTO: 29 PG (ref 26.5–33)
MCHC RBC AUTO-ENTMCNC: 31.9 G/DL (ref 31.5–36.5)
MCV RBC AUTO: 91 FL (ref 78–100)
PLATELET # BLD AUTO: 252 10E3/UL (ref 150–450)
RBC # BLD AUTO: 4.76 10E6/UL (ref 3.8–5.2)
WBC # BLD AUTO: 8.3 10E3/UL (ref 4–11)

## 2024-10-23 PROCEDURE — 82728 ASSAY OF FERRITIN: CPT | Performed by: OBSTETRICS & GYNECOLOGY

## 2024-10-23 PROCEDURE — 82306 VITAMIN D 25 HYDROXY: CPT | Performed by: OBSTETRICS & GYNECOLOGY

## 2024-10-23 PROCEDURE — 84443 ASSAY THYROID STIM HORMONE: CPT | Performed by: OBSTETRICS & GYNECOLOGY

## 2024-10-23 PROCEDURE — 85027 COMPLETE CBC AUTOMATED: CPT | Performed by: OBSTETRICS & GYNECOLOGY

## 2024-10-23 PROCEDURE — 80053 COMPREHEN METABOLIC PANEL: CPT | Performed by: OBSTETRICS & GYNECOLOGY

## 2024-10-24 LAB
ALBUMIN SERPL BCG-MCNC: 4.3 G/DL (ref 3.5–5.2)
ALP SERPL-CCNC: 87 U/L (ref 40–150)
ALT SERPL W P-5'-P-CCNC: 26 U/L (ref 0–50)
ANION GAP SERPL CALCULATED.3IONS-SCNC: 10 MMOL/L (ref 7–15)
AST SERPL W P-5'-P-CCNC: 19 U/L (ref 0–45)
BILIRUB SERPL-MCNC: 0.4 MG/DL
BUN SERPL-MCNC: 9.5 MG/DL (ref 6–20)
CALCIUM SERPL-MCNC: 9.9 MG/DL (ref 8.8–10.4)
CHLORIDE SERPL-SCNC: 106 MMOL/L (ref 98–107)
CREAT SERPL-MCNC: 0.87 MG/DL (ref 0.51–0.95)
EGFRCR SERPLBLD CKD-EPI 2021: 90 ML/MIN/1.73M2
FERRITIN SERPL-MCNC: 64 NG/ML (ref 6–175)
GLUCOSE SERPL-MCNC: 72 MG/DL (ref 70–99)
HCO3 SERPL-SCNC: 27 MMOL/L (ref 22–29)
POTASSIUM SERPL-SCNC: 4.4 MMOL/L (ref 3.4–5.3)
PROT SERPL-MCNC: 7.1 G/DL (ref 6.4–8.3)
SODIUM SERPL-SCNC: 143 MMOL/L (ref 135–145)
TSH SERPL DL<=0.005 MIU/L-ACNC: 1.29 UIU/ML (ref 0.3–4.2)
VIT D+METAB SERPL-MCNC: 57 NG/ML (ref 20–50)

## 2025-08-07 ENCOUNTER — LAB REQUISITION (OUTPATIENT)
Dept: LAB | Facility: CLINIC | Age: 35
End: 2025-08-07

## 2025-08-07 DIAGNOSIS — M25.542 PAIN IN JOINTS OF LEFT HAND: ICD-10-CM

## 2025-08-07 DIAGNOSIS — R25.2 CRAMP AND SPASM: ICD-10-CM

## 2025-08-07 DIAGNOSIS — M25.541 PAIN IN JOINTS OF RIGHT HAND: ICD-10-CM

## 2025-08-07 LAB
CRP SERPL-MCNC: <3 MG/L
ERYTHROCYTE [SEDIMENTATION RATE] IN BLOOD BY WESTERGREN METHOD: 8 MM/HR (ref 0–20)

## 2025-08-07 PROCEDURE — 86140 C-REACTIVE PROTEIN: CPT | Performed by: OBSTETRICS & GYNECOLOGY

## 2025-08-07 PROCEDURE — 82040 ASSAY OF SERUM ALBUMIN: CPT | Performed by: OBSTETRICS & GYNECOLOGY

## 2025-08-07 PROCEDURE — 85652 RBC SED RATE AUTOMATED: CPT | Performed by: OBSTETRICS & GYNECOLOGY

## 2025-08-08 LAB
ALBUMIN SERPL BCG-MCNC: 4.4 G/DL (ref 3.5–5.2)
ALP SERPL-CCNC: 61 U/L (ref 40–150)
ALT SERPL W P-5'-P-CCNC: 19 U/L (ref 0–50)
ANION GAP SERPL CALCULATED.3IONS-SCNC: 10 MMOL/L (ref 7–15)
AST SERPL W P-5'-P-CCNC: 18 U/L (ref 0–45)
BILIRUB SERPL-MCNC: 0.3 MG/DL
BUN SERPL-MCNC: 11.5 MG/DL (ref 6–20)
CALCIUM SERPL-MCNC: 9.7 MG/DL (ref 8.8–10.4)
CHLORIDE SERPL-SCNC: 105 MMOL/L (ref 98–107)
CREAT SERPL-MCNC: 0.72 MG/DL (ref 0.51–0.95)
EGFRCR SERPLBLD CKD-EPI 2021: >90 ML/MIN/1.73M2
GLUCOSE SERPL-MCNC: 64 MG/DL (ref 70–99)
HCO3 SERPL-SCNC: 26 MMOL/L (ref 22–29)
POTASSIUM SERPL-SCNC: 4.5 MMOL/L (ref 3.4–5.3)
PROT SERPL-MCNC: 7 G/DL (ref 6.4–8.3)
SODIUM SERPL-SCNC: 141 MMOL/L (ref 135–145)

## (undated) DEVICE — SU DERMABOND PROPEN .5ML DPP6

## (undated) DEVICE — PREP CHLORAPREP 26ML TINTED ORANGE  260815

## (undated) DEVICE — SU VICRYL 4-0 PS-2 18" UND J496H

## (undated) DEVICE — CATH TRAY FOLEY 16FR BARDEX W/DRAIN BAG STATLOCK 300316A

## (undated) DEVICE — SOL WATER IRRIG 1000ML BOTTLE 07139-09

## (undated) DEVICE — SU PLAIN 2-0 CT 27" 853H

## (undated) DEVICE — SU VICRYL 3-0 CT-1 36" J338H

## (undated) DEVICE — SU VICRYL 0 CTX 36" J370H

## (undated) DEVICE — GLOVE PROTEXIS W/NEU-THERA 6.5  2D73TE65

## (undated) DEVICE — ESU GROUND PAD UNIVERSAL W/O CORD

## (undated) DEVICE — SUCTION CANISTER MEDIVAC LINER 3000ML W/LID 65651-530

## (undated) DEVICE — SU VICRYL 0 CT-1 36" J346H

## (undated) DEVICE — SOL NACL 0.9% IRRIG 1000ML BOTTLE 07138-09

## (undated) DEVICE — BLADE CLIPPER 4406

## (undated) DEVICE — GLOVE BIOGEL 8 LATEX

## (undated) DEVICE — PACK C-SECTION LF PL15OTA83B

## (undated) DEVICE — LINEN C-SECTION 5415

## (undated) DEVICE — SU VICRYL 0 CT 36" J358H